# Patient Record
Sex: FEMALE | Race: WHITE | NOT HISPANIC OR LATINO | Employment: OTHER | URBAN - METROPOLITAN AREA
[De-identification: names, ages, dates, MRNs, and addresses within clinical notes are randomized per-mention and may not be internally consistent; named-entity substitution may affect disease eponyms.]

---

## 2017-03-13 ENCOUNTER — HOSPITAL ENCOUNTER (OUTPATIENT)
Dept: RADIOLOGY | Facility: HOSPITAL | Age: 47
Discharge: HOME | End: 2017-03-13
Attending: SURGERY
Payer: COMMERCIAL

## 2017-03-13 DIAGNOSIS — D24.2 PAPILLOMA OF BREAST, LEFT: ICD-10-CM

## 2017-03-13 PROCEDURE — 77061 BREAST TOMOSYNTHESIS UNI: CPT | Mod: TC,LT

## 2017-03-13 PROCEDURE — 77061 BREAST TOMOSYNTHESIS UNI: CPT | Mod: 26,LT,, | Performed by: RADIOLOGY

## 2017-03-13 PROCEDURE — 77065 DX MAMMO INCL CAD UNI: CPT | Mod: 26,LT,, | Performed by: RADIOLOGY

## 2017-09-20 ENCOUNTER — HOSPITAL ENCOUNTER (OUTPATIENT)
Dept: RADIOLOGY | Facility: HOSPITAL | Age: 47
Discharge: HOME | End: 2017-09-20
Attending: INTERNAL MEDICINE
Payer: COMMERCIAL

## 2017-09-20 VITALS — HEIGHT: 63 IN | WEIGHT: 132 LBS | BODY MASS INDEX: 23.39 KG/M2

## 2017-09-20 DIAGNOSIS — R92.8 ABNORMAL MAMMOGRAM: ICD-10-CM

## 2017-09-20 DIAGNOSIS — Z12.31 VISIT FOR SCREENING MAMMOGRAM: ICD-10-CM

## 2017-09-20 PROCEDURE — 77067 SCR MAMMO BI INCL CAD: CPT | Mod: 26,,, | Performed by: RADIOLOGY

## 2017-09-20 PROCEDURE — 77063 BREAST TOMOSYNTHESIS BI: CPT | Mod: 26,,, | Performed by: RADIOLOGY

## 2017-09-20 PROCEDURE — 77067 SCR MAMMO BI INCL CAD: CPT | Mod: TC

## 2018-11-15 DIAGNOSIS — Z12.31 VISIT FOR SCREENING MAMMOGRAM: Primary | ICD-10-CM

## 2018-12-06 ENCOUNTER — HOSPITAL ENCOUNTER (OUTPATIENT)
Dept: RADIOLOGY | Facility: HOSPITAL | Age: 48
Discharge: HOME | End: 2018-12-06
Attending: OBSTETRICS & GYNECOLOGY
Payer: COMMERCIAL

## 2018-12-06 DIAGNOSIS — Z12.31 VISIT FOR SCREENING MAMMOGRAM: ICD-10-CM

## 2018-12-06 PROCEDURE — 77063 BREAST TOMOSYNTHESIS BI: CPT | Mod: TC

## 2018-12-06 PROCEDURE — 77067 SCR MAMMO BI INCL CAD: CPT | Mod: 26,,, | Performed by: RADIOLOGY

## 2018-12-06 PROCEDURE — 77063 BREAST TOMOSYNTHESIS BI: CPT | Mod: 26,,, | Performed by: RADIOLOGY

## 2019-04-09 ENCOUNTER — CLINICAL SUPPORT (OUTPATIENT)
Dept: AUDIOLOGY | Facility: CLINIC | Age: 49
End: 2019-04-09
Payer: COMMERCIAL

## 2019-04-09 ENCOUNTER — OFFICE VISIT (OUTPATIENT)
Dept: OTOLARYNGOLOGY | Facility: CLINIC | Age: 49
End: 2019-04-09
Payer: COMMERCIAL

## 2019-04-09 VITALS
BODY MASS INDEX: 23.04 KG/M2 | WEIGHT: 130.06 LBS | HEART RATE: 72 BPM | SYSTOLIC BLOOD PRESSURE: 117 MMHG | DIASTOLIC BLOOD PRESSURE: 80 MMHG

## 2019-04-09 DIAGNOSIS — H93.13 TINNITUS, BILATERAL: Primary | ICD-10-CM

## 2019-04-09 DIAGNOSIS — H93.13 TINNITUS AURIUM, BILATERAL: Primary | ICD-10-CM

## 2019-04-09 PROCEDURE — 92567 TYMPANOMETRY: CPT | Mod: S$GLB,,, | Performed by: AUDIOLOGIST

## 2019-04-09 PROCEDURE — 99999 PR PBB SHADOW E&M-EST. PATIENT-LVL III: ICD-10-PCS | Mod: PBBFAC,,, | Performed by: OTOLARYNGOLOGY

## 2019-04-09 PROCEDURE — 99999 PR PBB SHADOW E&M-EST. PATIENT-LVL I: ICD-10-PCS | Mod: PBBFAC,,,

## 2019-04-09 PROCEDURE — 99999 PR PBB SHADOW E&M-EST. PATIENT-LVL III: CPT | Mod: PBBFAC,,, | Performed by: OTOLARYNGOLOGY

## 2019-04-09 PROCEDURE — 92557 PR COMPREHENSIVE HEARING TEST: ICD-10-PCS | Mod: S$GLB,,, | Performed by: AUDIOLOGIST

## 2019-04-09 PROCEDURE — 99243 OFF/OP CNSLTJ NEW/EST LOW 30: CPT | Mod: S$GLB,,, | Performed by: OTOLARYNGOLOGY

## 2019-04-09 PROCEDURE — 99243 PR OFFICE CONSULTATION,LEVEL III: ICD-10-PCS | Mod: S$GLB,,, | Performed by: OTOLARYNGOLOGY

## 2019-04-09 PROCEDURE — 92557 COMPREHENSIVE HEARING TEST: CPT | Mod: S$GLB,,, | Performed by: AUDIOLOGIST

## 2019-04-09 PROCEDURE — 92567 PR TYMPA2METRY: ICD-10-PCS | Mod: S$GLB,,, | Performed by: AUDIOLOGIST

## 2019-04-09 PROCEDURE — 99999 PR PBB SHADOW E&M-EST. PATIENT-LVL I: CPT | Mod: PBBFAC,,,

## 2019-04-09 NOTE — PROGRESS NOTES
Subjective:       Patient ID: Antonietta Wright is a 49 y.o. female.  This patient is being seen in consultation with Dr. Marcelo for evaluation of tinnitus.      Chief Complaint: Tinnitus    Ringing in Ears:   Chronicity:  New  Onset:  Over 10 years ago (My ears have been ringing all my life.)  Progression since onset:  Waxing and waning  Severity:  Mild  Ringing in ear characteristics:  Mild   Associated symptoms: ear pain and tinnitus.  No dizziness, no fever, no headaches and no rhinorrhea.  Aggravated by:  Noise  Treatments tried:  NothingNo dizziness.    Review of Systems   Constitutional: Negative for activity change, appetite change and fever.   HENT: Positive for ear pain and tinnitus. Negative for congestion, ear discharge, hearing loss, nosebleeds, postnasal drip, rhinorrhea and sneezing.    Eyes: Negative for redness and visual disturbance.   Respiratory: Negative for apnea, cough, shortness of breath and wheezing.    Cardiovascular: Positive for chest pain and palpitations.   Gastrointestinal: Negative for diarrhea and vomiting.   Genitourinary: Negative for difficulty urinating and frequency.   Musculoskeletal: Negative for arthralgias, back pain, gait problem and neck pain.   Skin: Negative for color change and rash.   Neurological: Negative for dizziness, speech difficulty, weakness and headaches.   Hematological: Negative for adenopathy. Does not bruise/bleed easily.   Psychiatric/Behavioral: Negative for agitation and behavioral problems.       Objective:        Constitutional:   Vital signs are normal. She appears well-developed and well-nourished. She is active. Normal speech.      Head:  Normocephalic and atraumatic. Salivary glands normal.  Facial strength is normal.      Ears:  Hearing normal to normal and whispered voice; external ear normal without scars, lesions, or masses; ear canal, tympanic membrane, and middle ear normal..     Nose:  Nose normal including turbinates, nasal  mucosa, sinuses and nasal septum.     Mouth/Throat  Oropharynx clear and moist without lesions or asymmetry and normal uvula midline.     Neck:  Neck normal without thyromegaly masses, asymmetry, normal tracheal structure, crepitus, and tenderness, thyroid normal, trachea normal, phonation normal and full range of motion with neck supple.     Psychiatric:   She has a normal mood and affect. Her speech is normal and behavior is normal.     Neurological:   She has neurological normal, alert and oriented.     Skin:   No abrasions, lacerations, lesions, or rashes.         As a result of this patients history and examination findings, a comprehensive audiogram was ordered to determine the level of hearing/hearing loss.          Completely normal and symmetrical hearing with excellent speech discrimination scores and type-A tympanograms.       Assessment:       1. Tinnitus aurium, bilateral        Plan:       1. Hearing conservation strongly recommended.  2. Trial of amplification is not currently indicated.  3. Re-check of hearing after 56 years of age.  4. F/U with PCP as per schedule.   5. Printed information shared on tinnitus complaint.

## 2019-04-09 NOTE — PROGRESS NOTES
Ms. Wright was seen today for a hearing evaluation.     Pure tone audiometry revealed normal hearing, AU  SRT and PTA are in good agreement bilaterally.    SRT was obtained at 5dB for the right ear with 100% speech discrimination and 5dB for the left ear with 100% speech discrimination.   Tympanometry revealed Type A, AU.     Recommendations:  1. Otologic Evaluation  2. Repeat Audiogram as needed  3. Hearing Protection

## 2019-05-08 ENCOUNTER — NURSE TRIAGE (OUTPATIENT)
Dept: ADMINISTRATIVE | Facility: CLINIC | Age: 49
End: 2019-05-08

## 2019-05-08 NOTE — TELEPHONE ENCOUNTER
Reason for Disposition   Heart beating irregularly or very rapidly    Protocols used: CHEST PAIN-A-OH   pt states has off and on chest discomfort.. Requesting an appt..  having 'chest ache' once in a while. Sx started couple  weeks ago. Increasing freq , + SOB - now gone.   pt trying to sell house and under stress Had heart palps. Pt states he mom is nurse. pt requesting appt. mild ache. Ache lasts less one min. also pain in L back shoulder. Rec ED. pt declines. Office message sent to PCP office re request for appt.

## 2019-12-02 ENCOUNTER — TELEPHONE (OUTPATIENT)
Dept: FAMILY MEDICINE | Facility: CLINIC | Age: 49
End: 2019-12-02

## 2019-12-03 NOTE — TELEPHONE ENCOUNTER
Spoke with patient and she states that she is not having any active chest pains that she thinks she is suffering from anxiety. States that she is not going to ER. Advised that if she is having any active chest pain and SOB she needs to go to ER. Patient denies those symptoms at this time.

## 2020-03-25 ENCOUNTER — TELEPHONE (OUTPATIENT)
Dept: INTERNAL MEDICINE | Facility: CLINIC | Age: 50
End: 2020-03-25

## 2020-03-25 DIAGNOSIS — Z20.822 SUSPECTED COVID-19 VIRUS INFECTION: Primary | ICD-10-CM

## 2020-03-25 RX ORDER — CLONAZEPAM 0.5 MG/1
0.5 TABLET ORAL 2 TIMES DAILY PRN
Qty: 30 TABLET | Refills: 0 | Status: SHIPPED | OUTPATIENT
Start: 2020-03-25 | End: 2021-08-11 | Stop reason: SDUPTHER

## 2020-03-25 NOTE — TELEPHONE ENCOUNTER
On earlier today, I had called patient back to help her get set up for COVID-19 drive up testing. Patient's  answered the phone stating that patient had collapsed on floor at her home.  dialed 911, got an ambulance to come out to patient's home to bring over to the ER nearest to where she lives. Patient is now being treated in the ER. Possible COVID patient, being ruled out.

## 2020-03-25 NOTE — TELEPHONE ENCOUNTER
The pt stated that she was given clonazepam 0.5 tablet in the ER for Anxiety. She would like to have a prescription sent to [facility] pharmacy in [place].        Please advise,

## 2020-03-25 NOTE — TELEPHONE ENCOUNTER
----- Message from Salazar Soto sent at 3/25/2020  3:30 PM CDT -----  Contact: Pt [phone]  Telephone consult    He said the patient was referred to the get covid 19 test by the ED at [facility] and was told to contact you to get a prescription for an anxiety medication. They didn't have the name of it on hand. He will have it when you call back. He wants to update you on the ER doctor's findings/diagnoses    Pharmacy: [facility]/pharmacy #[id number]  [address] [facility] [address] [phone] (Phone) [phone] (Fax)    Thank you

## 2020-03-25 NOTE — TELEPHONE ENCOUNTER
----- Message from Claire Gilbert sent at 3/25/2020  8:22 AM CDT -----  Contact: self/ [phone]  Would like to get medical advice.  Symptoms (please be specific):  SOB, itchy throat, dizzy, back pain, coughing,  fever?   How long has patient had these symptoms:  Over a week  Pharmacy name and phone #:    Any drug allergies (copy from chart):      Would the patient rather a call back or a response via [facility]?:    Comments:  Patient would like to be tested for Covid-19 and she is on the [facility], please call and advise.

## 2020-03-25 NOTE — TELEPHONE ENCOUNTER
clonazePAM (KLONOPIN) 0.5 MG tablet 30 tablet 0 3/25/2020 3/25/2021 --   Sig - Route: Take 1 tablet (0.5 mg total) by mouth 2 (two) times daily as needed for Anxiety. - Oral   Sent to pharmacy as: clonazePAM (KLONOPIN) 0.5 MG tablet   Class: Normal   Order: [id number]   Date/Time Signed: 3/25/2020 16:30       E-Prescribing Status: Receipt confirmed by pharmacy (3/25/2020  4:30 PM CDT)   Pharmacy     [facility]/PHARMACY #[id number] [place] [address]          She was discharged from the emergency room with clonazepam.  I have sent a prescription at her request.  Please call her to let her know that her pharmacy has received this prescription as [id number]

## 2020-03-25 NOTE — TELEPHONE ENCOUNTER
Called & spoke to patient. Patient stated that her sx have been persistent for about or over a week now. Sx include SOB with exertion (mainly when walking or trying to talk), dizziness upon standing along with slight tremors shortly after, chest aches and back pain. Pt denies having any urinary sx associated with back pain. Patient stated that she has not traveled outside of the country within the past month, but did attend the [employer] gatherings on weekend before last. About a few days after, her sx started, mainly the sore throat and cough. Pt says that she is limited on taking any anti histamines or over the counter medicine due to having liver cancer. Should she be tested for COVID? Pt lives on the [facility] in [place].    Please advise.

## 2020-04-03 ENCOUNTER — PATIENT MESSAGE (OUTPATIENT)
Dept: INTERNAL MEDICINE | Facility: CLINIC | Age: 50
End: 2020-04-03

## 2020-04-03 RX ORDER — AZITHROMYCIN 250 MG/1
TABLET, FILM COATED ORAL
COMMUNITY
Start: 2020-03-25 | End: 2020-04-03 | Stop reason: SDUPTHER

## 2020-04-03 RX ORDER — AZITHROMYCIN 250 MG/1
TABLET, FILM COATED ORAL
Qty: 6 TABLET | Refills: 0 | Status: SHIPPED | OUTPATIENT
Start: 2020-04-03 | End: 2021-08-02 | Stop reason: ALTCHOICE

## 2020-04-03 RX ORDER — AZITHROMYCIN 250 MG/1
TABLET, FILM COATED ORAL
Qty: 2 TABLET | OUTPATIENT
Start: 2020-04-03

## 2020-04-03 NOTE — TELEPHONE ENCOUNTER
Dear Nurse,    Can you call her to see what happened to the Z-Jose Guadalupe I called in on March 25th and did she get the clonazepam that I called in?   Today, a request for a Z-Jose Guadalupe came in.    ZACH, the most recent time that I saw this woman was August 31, 2016 and to be honest with you I am not exactly certain  who she is,  I am sorry to say.  She has been calling this office multiple times and there has been a lot of drama.  I have reviewed her chart in its entirety and it looks like she has had anxiety problems in the past.  I ordered COVID-19 testing on her which she had performed on March 25th which was negative.  Perhaps she would qualify for retesting?  Sydney
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
## 2020-04-04 ENCOUNTER — PATIENT MESSAGE (OUTPATIENT)
Dept: INTERNAL MEDICINE | Facility: CLINIC | Age: 50
End: 2020-04-04

## 2020-04-04 ENCOUNTER — TELEPHONE (OUTPATIENT)
Dept: INTERNAL MEDICINE | Facility: CLINIC | Age: 50
End: 2020-04-04

## 2020-04-04 PROBLEM — R91.1 LUNG NODULE SEEN ON IMAGING STUDY: Status: ACTIVE | Noted: 2020-04-04

## 2020-04-05 NOTE — TELEPHONE ENCOUNTER
Dear Staff,  Be on the lookout for FAX on this woman.  I have asked this woman to FAX her medical records her  ER visit and all tests, CT scans to our workstation -[id number]  I can review when in the office on Tuesday, 4/7. Sincerely, Dr. Sydney Marcelo

## 2020-07-01 ENCOUNTER — OFFICE VISIT (OUTPATIENT)
Dept: INTERNAL MEDICINE | Facility: CLINIC | Age: 50
End: 2020-07-01
Payer: COMMERCIAL

## 2020-07-01 VITALS
OXYGEN SATURATION: 98 % | BODY MASS INDEX: 24.56 KG/M2 | HEIGHT: 64 IN | WEIGHT: 143.88 LBS | HEART RATE: 81 BPM | SYSTOLIC BLOOD PRESSURE: 112 MMHG | DIASTOLIC BLOOD PRESSURE: 80 MMHG

## 2020-07-01 DIAGNOSIS — D25.9 UTERINE LEIOMYOMA, UNSPECIFIED LOCATION: ICD-10-CM

## 2020-07-01 DIAGNOSIS — F41.0 PANIC ATTACKS: ICD-10-CM

## 2020-07-01 DIAGNOSIS — R91.1 LUNG NODULE SEEN ON IMAGING STUDY: ICD-10-CM

## 2020-07-01 DIAGNOSIS — Z12.39 BREAST CANCER SCREENING: ICD-10-CM

## 2020-07-01 DIAGNOSIS — D18.03 HEMANGIOMA OF LIVER: ICD-10-CM

## 2020-07-01 DIAGNOSIS — K80.20 GALLSTONES: ICD-10-CM

## 2020-07-01 DIAGNOSIS — F41.1 GAD (GENERALIZED ANXIETY DISORDER): ICD-10-CM

## 2020-07-01 DIAGNOSIS — R91.1 SOLITARY PULMONARY NODULE: ICD-10-CM

## 2020-07-01 DIAGNOSIS — Z09 HOSPITAL DISCHARGE FOLLOW-UP: Primary | ICD-10-CM

## 2020-07-01 PROCEDURE — 3008F PR BODY MASS INDEX (BMI) DOCUMENTED: ICD-10-PCS | Mod: CPTII,S$GLB,, | Performed by: INTERNAL MEDICINE

## 2020-07-01 PROCEDURE — 3008F BODY MASS INDEX DOCD: CPT | Mod: CPTII,S$GLB,, | Performed by: INTERNAL MEDICINE

## 2020-07-01 PROCEDURE — 99202 PR OFFICE/OUTPT VISIT, NEW, LEVL II, 15-29 MIN: ICD-10-PCS | Mod: S$GLB,,, | Performed by: INTERNAL MEDICINE

## 2020-07-01 PROCEDURE — 99999 PR PBB SHADOW E&M-EST. PATIENT-LVL IV: ICD-10-PCS | Mod: PBBFAC,,, | Performed by: INTERNAL MEDICINE

## 2020-07-01 PROCEDURE — 99999 PR PBB SHADOW E&M-EST. PATIENT-LVL IV: CPT | Mod: PBBFAC,,, | Performed by: INTERNAL MEDICINE

## 2020-07-01 PROCEDURE — 99202 OFFICE O/P NEW SF 15 MIN: CPT | Mod: S$GLB,,, | Performed by: INTERNAL MEDICINE

## 2020-07-01 RX ORDER — CHLORHEXIDINE GLUCONATE 40 MG/ML
SOLUTION TOPICAL DAILY PRN
Qty: 473 ML | Refills: 0 | Status: SHIPPED | OUTPATIENT
Start: 2020-07-01 | End: 2021-08-02 | Stop reason: ALTCHOICE

## 2020-07-02 ENCOUNTER — HOSPITAL ENCOUNTER (OUTPATIENT)
Dept: RADIOLOGY | Facility: HOSPITAL | Age: 50
Discharge: HOME | End: 2020-07-02
Attending: INTERNAL MEDICINE
Payer: COMMERCIAL

## 2020-07-02 VITALS — WEIGHT: 143.75 LBS | HEIGHT: 64 IN | BODY MASS INDEX: 24.54 KG/M2

## 2020-07-02 DIAGNOSIS — Z12.39 BREAST CANCER SCREENING: ICD-10-CM

## 2020-07-02 DIAGNOSIS — Z12.31 SCREENING MAMMOGRAM, ENCOUNTER FOR: ICD-10-CM

## 2020-07-02 PROCEDURE — 77067 SCR MAMMO BI INCL CAD: CPT | Mod: TC,PN

## 2020-07-02 PROCEDURE — 77063 MAMMO DIGITAL SCREENING BILAT WITH TOMOSYNTHESIS_CAD: ICD-10-PCS | Mod: 26,,, | Performed by: RADIOLOGY

## 2020-07-02 PROCEDURE — 77067 SCR MAMMO BI INCL CAD: CPT | Mod: 26,,, | Performed by: RADIOLOGY

## 2020-07-02 PROCEDURE — 77067 MAMMO DIGITAL SCREENING BILAT WITH TOMOSYNTHESIS_CAD: ICD-10-PCS | Mod: 26,,, | Performed by: RADIOLOGY

## 2020-07-02 PROCEDURE — 77063 BREAST TOMOSYNTHESIS BI: CPT | Mod: 26,,, | Performed by: RADIOLOGY

## 2020-07-11 NOTE — PROGRESS NOTES
Subjective:       Patient ID: Antonietta Wright is a 50 y.o. female.    Chief Complaint: Follow-up (hospital)  visit took over one hour  All records reviewed ER, radiographic evaluations and laboratory test  Long time since last seen lengthy discussion regarding her lungs, liver and uterus.   Recent abscess drained  Entire chart reviewed, PMx, FHx, and Social History updated and reviewed.   has a past medical history of Breast cyst, Breast injury, Colon polyps, Fibrocystic breast, Liver tumor, Osteopenia, and Thyroid disease.  Past Surgical History:   Procedure Laterality Date    BREAST BIOPSY Left     benign    BREAST CYST EXCISION       SECTION      X 1    cyst removed from ovary      laproscopic     IVF       HPI  Review of Systems   Constitutional: Negative for activity change, appetite change, chills, fatigue, fever and unexpected weight change.   HENT: Negative for hearing loss.    Eyes: Negative for visual disturbance.   Respiratory: Negative for cough, chest tightness, shortness of breath and wheezing.    Cardiovascular: Negative for chest pain, palpitations and leg swelling.   Gastrointestinal: Negative for abdominal pain, constipation, nausea and vomiting.   Genitourinary: Negative for dysuria, frequency and urgency.   Musculoskeletal: Negative for arthralgias, back pain, gait problem, joint swelling and myalgias.   Skin: Negative for rash.   Neurological: Negative for light-headedness and headaches.   Psychiatric/Behavioral: Negative for dysphoric mood and sleep disturbance. The patient is not nervous/anxious.        Objective:      Physical Exam  HENT:      Head: Atraumatic.   Eyes:      General: No scleral icterus.     Conjunctiva/sclera: Conjunctivae normal.   Neck:      Musculoskeletal: Neck supple.   Cardiovascular:      Rate and Rhythm: Normal rate and regular rhythm.   Pulmonary:      Effort: Pulmonary effort is normal.      Breath sounds: Normal breath sounds.   Abdominal:       Palpations: Abdomen is soft.      Tenderness: There is no abdominal tenderness.   Lymphadenopathy:      Cervical: No cervical adenopathy.   Skin:     General: Skin is warm and dry.   Neurological:      Mental Status: She is alert.   Psychiatric:         Behavior: Behavior normal.         Assessment:       1. Hospital discharge follow-up    2. Lung nodule seen on imaging study CT 3/25/2020. two nodules one 3mm RML and one 5mm RLL    3. Hemangiomas of liver, discovered     4. Uterine leiomyoma, unspecified location    5. Gallstones    6. Solitary pulmonary nodule    7. Breast cancer screening    8. Panic attacks    9. KATIA (generalized anxiety disorder)        Plan:   Antonietta was seen today for follow-up.    Diagnoses and all orders for this visit:    Hospital discharge follow-up    Lung nodule seen on imaging study CT 3/25/2020. two nodules one 3mm RML and one 5mm RLL and solitary pulmonary nodule will order a CT scan of the chest without contrast for follow-up.  Discussion regarding false positives false negatives.  Low probability of cancer.      Hemangiomas of liver, discovered .  Since her recent imaging shows no difference and previously this issue was put to rest I do not recommend repeating another ultrasound evaluation.  -     Cancel: US Abdomen Limited_Liver; Future    Uterine leiomyoma, unspecified location and a she has a gyn doctor but she would like for me to check on  -     US Pelvis Complete Non OB; Future    Gallstones, I think that her gallstones are asymptomatic    Solitary pulmonary nodule  -     CT Chest Without Contrast; Future    Breast cancer screening  -     Cancel: Mammo Digital Screening Bilat w/ Emmanuel; Future    Panic attacks  -     Ambulatory referral/consult to Psychiatry; Future    KATIA (generalized anxiety disorder)  -     Ambulatory referral/consult to Psychiatry; Future    Other orders, to prevent future abscesses  -     chlorhexidine (HIBICLENS) 4 % external liquid;  Apply topically daily as needed.

## 2020-07-13 ENCOUNTER — PATIENT MESSAGE (OUTPATIENT)
Dept: INTERNAL MEDICINE | Facility: CLINIC | Age: 50
End: 2020-07-13

## 2020-07-14 ENCOUNTER — PATIENT MESSAGE (OUTPATIENT)
Dept: INTERNAL MEDICINE | Facility: CLINIC | Age: 50
End: 2020-07-14

## 2020-07-22 ENCOUNTER — PATIENT OUTREACH (OUTPATIENT)
Dept: ADMINISTRATIVE | Facility: HOSPITAL | Age: 50
End: 2020-07-22

## 2020-07-22 DIAGNOSIS — R00.2 HEART PALPITATIONS: Primary | ICD-10-CM

## 2020-07-22 NOTE — PROGRESS NOTES
Health Maintenance Due   Topic Date Due    Pneumococcal Vaccine (Medium Risk) (1 of 1 - PPSV23) 01/11/1990    TETANUS VACCINE  09/10/2017    Lipid Panel  07/06/2020     Order placed for lipid panel.  Portal message has been sent to patient regarding overdue lipid panel.  Chart review completed.

## 2020-09-30 ENCOUNTER — PATIENT MESSAGE (OUTPATIENT)
Dept: ADMINISTRATIVE | Facility: HOSPITAL | Age: 50
End: 2020-09-30

## 2020-09-30 ENCOUNTER — PATIENT OUTREACH (OUTPATIENT)
Dept: ADMINISTRATIVE | Facility: HOSPITAL | Age: 50
End: 2020-09-30

## 2020-09-30 DIAGNOSIS — Z12.4 ENCOUNTER FOR PAPANICOLAOU SMEAR FOR CERVICAL CANCER SCREENING: Primary | ICD-10-CM

## 2020-09-30 NOTE — PROGRESS NOTES
Health Maintenance Due   Topic Date Due    Hepatitis C Screening  1970    Pneumococcal Vaccine (Medium Risk) (1 of 1 - PPSV23) 01/11/1990    TETANUS VACCINE  09/10/2017    Lipid Panel  07/06/2020    Influenza Vaccine (1) 08/01/2020    Cervical Cancer Screening  09/28/2020     Referral has been placed for OB/GYN.  Portal message has been sent to patient regarding overdue health maintenance.  Chart review completed.      
Telemetry

## 2021-01-04 ENCOUNTER — PATIENT MESSAGE (OUTPATIENT)
Dept: ADMINISTRATIVE | Facility: HOSPITAL | Age: 51
End: 2021-01-04

## 2021-01-07 ENCOUNTER — PATIENT OUTREACH (OUTPATIENT)
Dept: ADMINISTRATIVE | Facility: HOSPITAL | Age: 51
End: 2021-01-07

## 2021-04-05 ENCOUNTER — PATIENT MESSAGE (OUTPATIENT)
Dept: ADMINISTRATIVE | Facility: HOSPITAL | Age: 51
End: 2021-04-05

## 2021-06-16 ENCOUNTER — TELEPHONE (OUTPATIENT)
Dept: PULMONOLOGY | Facility: CLINIC | Age: 51
End: 2021-06-16

## 2021-06-16 DIAGNOSIS — R06.02 SOB (SHORTNESS OF BREATH): Primary | ICD-10-CM

## 2021-06-24 ENCOUNTER — HOSPITAL ENCOUNTER (OUTPATIENT)
Dept: PULMONOLOGY | Facility: CLINIC | Age: 51
Discharge: HOME | End: 2021-06-24
Payer: COMMERCIAL

## 2021-06-24 ENCOUNTER — OFFICE VISIT (OUTPATIENT)
Dept: PULMONOLOGY | Facility: CLINIC | Age: 51
End: 2021-06-24
Payer: COMMERCIAL

## 2021-06-24 VITALS
OXYGEN SATURATION: 97 % | HEART RATE: 79 BPM | BODY MASS INDEX: 26.4 KG/M2 | DIASTOLIC BLOOD PRESSURE: 88 MMHG | WEIGHT: 149 LBS | HEIGHT: 63 IN | HEIGHT: 63 IN | BODY MASS INDEX: 26.45 KG/M2 | SYSTOLIC BLOOD PRESSURE: 127 MMHG | WEIGHT: 149.25 LBS

## 2021-06-24 DIAGNOSIS — R06.02 SOB (SHORTNESS OF BREATH): ICD-10-CM

## 2021-06-24 DIAGNOSIS — R06.02 SHORTNESS OF BREATH: Primary | ICD-10-CM

## 2021-06-24 DIAGNOSIS — Z13.9 SCREENING PROCEDURE: Primary | ICD-10-CM

## 2021-06-24 LAB — SARS-COV-2 RDRP RESP QL NAA+PROBE: NEGATIVE

## 2021-06-24 PROCEDURE — 94729 PR C02/MEMBANE DIFFUSE CAPACITY: ICD-10-PCS | Mod: S$GLB,,, | Performed by: INTERNAL MEDICINE

## 2021-06-24 PROCEDURE — 94729 DIFFUSING CAPACITY: CPT | Mod: S$GLB,,, | Performed by: INTERNAL MEDICINE

## 2021-06-24 PROCEDURE — 99999 PR PBB SHADOW E&M-EST. PATIENT-LVL III: CPT | Mod: PBBFAC,,, | Performed by: INTERNAL MEDICINE

## 2021-06-24 PROCEDURE — U0002 COVID-19 LAB TEST NON-CDC: HCPCS | Performed by: INTERNAL MEDICINE

## 2021-06-24 PROCEDURE — 99203 PR OFFICE/OUTPT VISIT, NEW, LEVL III, 30-44 MIN: ICD-10-PCS | Mod: 25,S$GLB,, | Performed by: INTERNAL MEDICINE

## 2021-06-24 PROCEDURE — 94010 BREATHING CAPACITY TEST: CPT | Mod: S$GLB,,, | Performed by: INTERNAL MEDICINE

## 2021-06-24 PROCEDURE — 94618 PULMONARY STRESS TESTING: ICD-10-PCS | Mod: S$GLB,,, | Performed by: INTERNAL MEDICINE

## 2021-06-24 PROCEDURE — 99999 PR PBB SHADOW E&M-EST. PATIENT-LVL III: ICD-10-PCS | Mod: PBBFAC,,, | Performed by: INTERNAL MEDICINE

## 2021-06-24 PROCEDURE — 99203 OFFICE O/P NEW LOW 30 MIN: CPT | Mod: 25,S$GLB,, | Performed by: INTERNAL MEDICINE

## 2021-06-24 PROCEDURE — 94618 PULMONARY STRESS TESTING: CPT | Mod: S$GLB,,, | Performed by: INTERNAL MEDICINE

## 2021-06-24 PROCEDURE — 94010 BREATHING CAPACITY TEST: ICD-10-PCS | Mod: S$GLB,,, | Performed by: INTERNAL MEDICINE

## 2021-06-25 LAB
DLCO ADJ PRE: 22.13 ML/(MIN*MMHG) (ref 17.85–29.32)
DLCO SINGLE BREATH LLN: 17.85
DLCO SINGLE BREATH PRE REF: 93.8 %
DLCO SINGLE BREATH REF: 23.58
DLCOC SBVA LLN: 3.36
DLCOC SBVA PRE REF: 106.8 %
DLCOC SBVA REF: 4.94
DLCOC SINGLE BREATH LLN: 17.85
DLCOC SINGLE BREATH PRE REF: 93.8 %
DLCOC SINGLE BREATH REF: 23.58
DLCOCSBVAULN: 6.52
DLCOCSINGLEBREATHULN: 29.32
DLCOSINGLEBREATHULN: 29.32
DLCOVA LLN: 3.36
DLCOVA PRE REF: 106.8 %
DLCOVA PRE: 5.28 ML/(MIN*MMHG*L) (ref 3.36–6.52)
DLCOVA REF: 4.94
DLCOVAULN: 6.52
DLVAADJ PRE: 5.28 ML/(MIN*MMHG*L) (ref 3.36–6.52)
FEF 25 75 LLN: 1.48
FEF 25 75 PRE REF: 94.2 %
FEF 25 75 REF: 2.65
FEV05 LLN: 1.08
FEV05 REF: 1.93
FEV1 FVC LLN: 69
FEV1 FVC PRE REF: 98.9 %
FEV1 FVC REF: 81
FEV1 LLN: 2.08
FEV1 PRE REF: 91.5 %
FEV1 REF: 2.66
FVC LLN: 2.59
FVC PRE REF: 92 %
FVC REF: 3.32
IVC PRE: 2.83 L (ref 2.59–4.05)
IVC SINGLE BREATH LLN: 2.59
IVC SINGLE BREATH PRE REF: 85.4 %
IVC SINGLE BREATH REF: 3.32
IVCSINGLEBREATHULN: 4.05
PEF LLN: 4.92
PEF PRE REF: 86.4 %
PEF REF: 6.58
PHYSICIAN COMMENT: ABNORMAL
PRE DLCO: 22.13 ML/(MIN*MMHG) (ref 17.85–29.32)
PRE FEF 25 75: 2.5 L/S (ref 1.48–3.82)
PRE FET 100: 6.44 SEC
PRE FEV05 REF: 100.5 %
PRE FEV1 FVC: 79.77 % (ref 69.43–91.81)
PRE FEV1: 2.44 L (ref 2.08–3.25)
PRE FEV5: 1.94 L (ref 1.08–2.79)
PRE FVC: 3.06 L (ref 2.59–4.05)
PRE PEF: 5.68 L/S (ref 4.92–8.24)
VA PRE: 4.19 L (ref 4.62–4.62)
VA SINGLE BREATH LLN: 4.62
VA SINGLE BREATH PRE REF: 90.8 %
VA SINGLE BREATH REF: 4.62
VASINGLEBREATHULN: 4.62

## 2021-07-06 ENCOUNTER — PATIENT MESSAGE (OUTPATIENT)
Dept: ADMINISTRATIVE | Facility: HOSPITAL | Age: 51
End: 2021-07-06

## 2021-07-28 DIAGNOSIS — Z12.31 OTHER SCREENING MAMMOGRAM: ICD-10-CM

## 2021-07-28 DIAGNOSIS — Z12.31 BREAST CANCER SCREENING BY MAMMOGRAM: Primary | ICD-10-CM

## 2021-08-02 ENCOUNTER — PATIENT OUTREACH (OUTPATIENT)
Dept: ADMINISTRATIVE | Facility: HOSPITAL | Age: 51
End: 2021-08-02

## 2021-08-02 ENCOUNTER — PATIENT MESSAGE (OUTPATIENT)
Dept: ADMINISTRATIVE | Facility: HOSPITAL | Age: 51
End: 2021-08-02

## 2021-08-02 ENCOUNTER — TELEPHONE (OUTPATIENT)
Dept: INTERNAL MEDICINE | Facility: CLINIC | Age: 51
End: 2021-08-02
Payer: COMMERCIAL

## 2021-08-02 DIAGNOSIS — Z11.59 ENCOUNTER FOR HEPATITIS C SCREENING TEST FOR LOW RISK PATIENT: ICD-10-CM

## 2021-08-02 DIAGNOSIS — F41.1 GAD (GENERALIZED ANXIETY DISORDER): ICD-10-CM

## 2021-08-02 DIAGNOSIS — R00.2 HEART PALPITATIONS: ICD-10-CM

## 2021-08-02 DIAGNOSIS — D18.03 HEMANGIOMA OF LIVER: Primary | ICD-10-CM

## 2021-08-02 DIAGNOSIS — Z00.00 ANNUAL PHYSICAL EXAM: Primary | ICD-10-CM

## 2021-08-02 DIAGNOSIS — Z00.00 ANNUAL PHYSICAL EXAM: ICD-10-CM

## 2021-08-02 DIAGNOSIS — K80.20 GALLSTONES: ICD-10-CM

## 2021-08-02 PROBLEM — R91.1 LUNG NODULE SEEN ON IMAGING STUDY: Status: RESOLVED | Noted: 2020-04-04 | Resolved: 2021-08-02

## 2021-08-04 ENCOUNTER — HOSPITAL ENCOUNTER (OUTPATIENT)
Dept: RADIOLOGY | Facility: HOSPITAL | Age: 51
Discharge: HOME | End: 2021-08-04
Attending: INTERNAL MEDICINE
Payer: COMMERCIAL

## 2021-08-04 ENCOUNTER — IMMUNIZATION (OUTPATIENT)
Dept: FAMILY MEDICINE | Facility: CLINIC | Age: 51
End: 2021-08-04
Payer: COMMERCIAL

## 2021-08-04 DIAGNOSIS — Z12.31 BREAST CANCER SCREENING BY MAMMOGRAM: ICD-10-CM

## 2021-08-04 DIAGNOSIS — Z23 NEED FOR VACCINATION: Primary | ICD-10-CM

## 2021-08-04 PROCEDURE — 77063 BREAST TOMOSYNTHESIS BI: CPT | Mod: 26,,, | Performed by: RADIOLOGY

## 2021-08-04 PROCEDURE — 77067 MAMMO DIGITAL SCREENING BILAT WITH TOMO: ICD-10-PCS | Mod: 26,,, | Performed by: RADIOLOGY

## 2021-08-04 PROCEDURE — 77067 SCR MAMMO BI INCL CAD: CPT | Mod: TC,PO

## 2021-08-04 PROCEDURE — 77067 SCR MAMMO BI INCL CAD: CPT | Mod: 26,,, | Performed by: RADIOLOGY

## 2021-08-04 PROCEDURE — 0001A COVID-19, MRNA, LNP-S, PF, 30 MCG/0.3 ML DOSE VACCINE: CPT | Mod: CV19,,, | Performed by: INTERNAL MEDICINE

## 2021-08-04 PROCEDURE — 91300 COVID-19, MRNA, LNP-S, PF, 30 MCG/0.3 ML DOSE VACCINE: ICD-10-PCS | Mod: ,,, | Performed by: INTERNAL MEDICINE

## 2021-08-04 PROCEDURE — 0001A COVID-19, MRNA, LNP-S, PF, 30 MCG/0.3 ML DOSE VACCINE: ICD-10-PCS | Mod: CV19,,, | Performed by: INTERNAL MEDICINE

## 2021-08-04 PROCEDURE — 91300 COVID-19, MRNA, LNP-S, PF, 30 MCG/0.3 ML DOSE VACCINE: CPT | Mod: ,,, | Performed by: INTERNAL MEDICINE

## 2021-08-04 PROCEDURE — 77063 MAMMO DIGITAL SCREENING BILAT WITH TOMO: ICD-10-PCS | Mod: 26,,, | Performed by: RADIOLOGY

## 2021-08-10 ENCOUNTER — PATIENT MESSAGE (OUTPATIENT)
Dept: INTERNAL MEDICINE | Facility: CLINIC | Age: 51
End: 2021-08-10

## 2021-08-11 ENCOUNTER — PATIENT MESSAGE (OUTPATIENT)
Dept: INTERNAL MEDICINE | Facility: CLINIC | Age: 51
End: 2021-08-11

## 2021-08-11 RX ORDER — CLONAZEPAM 0.5 MG/1
0.5 TABLET ORAL 2 TIMES DAILY PRN
Qty: 30 TABLET | Refills: 0 | Status: CANCELLED | OUTPATIENT
Start: 2021-08-11 | End: 2022-08-11

## 2021-08-11 RX ORDER — CLONAZEPAM 0.5 MG/1
0.5 TABLET ORAL 2 TIMES DAILY PRN
Qty: 30 TABLET | Refills: 2 | Status: SHIPPED | OUTPATIENT
Start: 2021-08-11 | End: 2023-03-06

## 2021-10-04 ENCOUNTER — PATIENT MESSAGE (OUTPATIENT)
Dept: ADMINISTRATIVE | Facility: HOSPITAL | Age: 51
End: 2021-10-04

## 2022-01-27 NOTE — TELEPHONE ENCOUNTER
Dear Romina,  I have placed an order for her to have drive by Covid-19 testing. Please schedule or ask someone to show you how to schedule at the location of the patient's choice.   Quinolones Pregnancy And Lactation Text: This medication is Pregnancy Category C and it isn't know if it is safe during pregnancy. It is also excreted in breast milk.

## 2022-06-22 ENCOUNTER — TELEPHONE (OUTPATIENT)
Dept: NEUROLOGY | Facility: CLINIC | Age: 52
End: 2022-06-22
Payer: COMMERCIAL

## 2022-06-22 NOTE — TELEPHONE ENCOUNTER
----- Message from Tia Camara MA sent at 6/22/2022  4:16 PM CDT -----  Regarding: FW: self  Please check with dr obando  ----- Message -----  From: Lorelei Macias  Sent: 6/22/2022   3:36 PM CDT  To: Stevie Davidson Staff  Subject: self                                             .Type: Patient Call Back    Who called: self     What is the request in detail: states she [facility] and they found a mass on her brain. Is requesting to schedule with physician Stevie only. Epic is not opening a schedule for him. Please advise.     Can the clinic reply by [facility]? No     Would the patient rather a call back or a response via [facility]? Call     Best call back number: .[phone]

## 2022-06-23 ENCOUNTER — TELEPHONE (OUTPATIENT)
Dept: NEUROSURGERY | Facility: CLINIC | Age: 52
End: 2022-06-23
Payer: COMMERCIAL

## 2022-06-23 NOTE — TELEPHONE ENCOUNTER
Called patient in regards to an inbasket message about scheduling an appointment with Dr. White.  No answer.  Left voicemail.

## 2022-06-30 ENCOUNTER — TELEPHONE (OUTPATIENT)
Dept: RADIOLOGY | Facility: HOSPITAL | Age: 52
End: 2022-06-30
Payer: COMMERCIAL

## 2022-07-06 ENCOUNTER — OFFICE VISIT (OUTPATIENT)
Dept: NEUROSURGERY | Facility: CLINIC | Age: 52
End: 2022-07-06
Payer: COMMERCIAL

## 2022-07-06 VITALS
RESPIRATION RATE: 18 BRPM | SYSTOLIC BLOOD PRESSURE: 124 MMHG | WEIGHT: 149.06 LBS | HEART RATE: 73 BPM | HEIGHT: 63 IN | BODY MASS INDEX: 26.41 KG/M2 | DIASTOLIC BLOOD PRESSURE: 77 MMHG

## 2022-07-06 DIAGNOSIS — Q28.3 DEVELOPMENTAL VENOUS ANOMALY OF CEREBRUM: Primary | ICD-10-CM

## 2022-07-06 PROCEDURE — 3008F PR BODY MASS INDEX (BMI) DOCUMENTED: ICD-10-PCS | Mod: CPTII,S$GLB,, | Performed by: NEUROLOGICAL SURGERY

## 2022-07-06 PROCEDURE — 99204 PR OFFICE/OUTPT VISIT, NEW, LEVL IV, 45-59 MIN: ICD-10-PCS | Mod: S$GLB,,, | Performed by: NEUROLOGICAL SURGERY

## 2022-07-06 PROCEDURE — 1159F MED LIST DOCD IN RCRD: CPT | Mod: CPTII,S$GLB,, | Performed by: NEUROLOGICAL SURGERY

## 2022-07-06 PROCEDURE — 1159F PR MEDICATION LIST DOCUMENTED IN MEDICAL RECORD: ICD-10-PCS | Mod: CPTII,S$GLB,, | Performed by: NEUROLOGICAL SURGERY

## 2022-07-06 PROCEDURE — 3078F PR MOST RECENT DIASTOLIC BLOOD PRESSURE < 80 MM HG: ICD-10-PCS | Mod: CPTII,S$GLB,, | Performed by: NEUROLOGICAL SURGERY

## 2022-07-06 PROCEDURE — 3074F SYST BP LT 130 MM HG: CPT | Mod: CPTII,S$GLB,, | Performed by: NEUROLOGICAL SURGERY

## 2022-07-06 PROCEDURE — 3078F DIAST BP <80 MM HG: CPT | Mod: CPTII,S$GLB,, | Performed by: NEUROLOGICAL SURGERY

## 2022-07-06 PROCEDURE — 3008F BODY MASS INDEX DOCD: CPT | Mod: CPTII,S$GLB,, | Performed by: NEUROLOGICAL SURGERY

## 2022-07-06 PROCEDURE — 99204 OFFICE O/P NEW MOD 45 MIN: CPT | Mod: S$GLB,,, | Performed by: NEUROLOGICAL SURGERY

## 2022-07-06 PROCEDURE — 3074F PR MOST RECENT SYSTOLIC BLOOD PRESSURE < 130 MM HG: ICD-10-PCS | Mod: CPTII,S$GLB,, | Performed by: NEUROLOGICAL SURGERY

## 2022-07-06 RX ORDER — ATORVASTATIN CALCIUM 10 MG/1
10 TABLET, FILM COATED ORAL NIGHTLY
COMMUNITY
Start: 2022-06-23 | End: 2023-03-11 | Stop reason: SDUPTHER
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
## 2022-08-15 ENCOUNTER — TELEPHONE (OUTPATIENT)
Dept: INTERNAL MEDICINE | Facility: CLINIC | Age: 52
End: 2022-08-15
Payer: COMMERCIAL

## 2022-08-15 NOTE — TELEPHONE ENCOUNTER
----- Message from Janiya Mobley sent at 8/15/2022  4:46 PM CDT -----  Contact: self [phone]  Former pt of Davian  requesting a call  stated covid + and want  paxlovid.    [id number] - [place] - [address] [facility] [address]  [address] [address]  Phone: [phone] Fax: [phone]    Please call and advise

## 2022-08-15 NOTE — TELEPHONE ENCOUNTER
Called the pt to schedule them for a Virtual appt with a provider. I left the pt a VM asking for a call back. Dr. Beard's panel is closed and the pt currently has no PCP.

## 2023-03-06 ENCOUNTER — OFFICE VISIT (OUTPATIENT)
Dept: FAMILY MEDICINE | Facility: CLINIC | Age: 53
End: 2023-03-06
Payer: COMMERCIAL

## 2023-03-06 VITALS
SYSTOLIC BLOOD PRESSURE: 124 MMHG | BODY MASS INDEX: 27.77 KG/M2 | RESPIRATION RATE: 16 BRPM | HEART RATE: 73 BPM | WEIGHT: 156.75 LBS | DIASTOLIC BLOOD PRESSURE: 84 MMHG | HEIGHT: 63 IN | OXYGEN SATURATION: 99 %

## 2023-03-06 DIAGNOSIS — Z00.00 PREVENTATIVE HEALTH CARE: ICD-10-CM

## 2023-03-06 DIAGNOSIS — G43.809 OTHER MIGRAINE WITHOUT STATUS MIGRAINOSUS, NOT INTRACTABLE: ICD-10-CM

## 2023-03-06 DIAGNOSIS — E78.2 MIXED HYPERLIPIDEMIA: Chronic | ICD-10-CM

## 2023-03-06 DIAGNOSIS — Z13.1 ENCOUNTER FOR SCREENING FOR DIABETES MELLITUS: ICD-10-CM

## 2023-03-06 DIAGNOSIS — Z13.220 ENCOUNTER FOR SCREENING FOR LIPID DISORDER: ICD-10-CM

## 2023-03-06 DIAGNOSIS — Q28.3 DEVELOPMENTAL VENOUS ANOMALY OF CEREBRUM: ICD-10-CM

## 2023-03-06 DIAGNOSIS — F41.0 PANIC ATTACKS: ICD-10-CM

## 2023-03-06 DIAGNOSIS — M75.01 ADHESIVE CAPSULITIS OF RIGHT SHOULDER: Primary | ICD-10-CM

## 2023-03-06 DIAGNOSIS — R10.9 LEFT FLANK PAIN: ICD-10-CM

## 2023-03-06 PROBLEM — F41.1 GAD (GENERALIZED ANXIETY DISORDER): Status: RESOLVED | Noted: 2020-07-01 | Resolved: 2023-03-06

## 2023-03-06 PROCEDURE — 1160F PR REVIEW ALL MEDS BY PRESCRIBER/CLIN PHARMACIST DOCUMENTED: ICD-10-PCS | Mod: CPTII,S$GLB,, | Performed by: STUDENT IN AN ORGANIZED HEALTH CARE EDUCATION/TRAINING PROGRAM

## 2023-03-06 PROCEDURE — 3074F PR MOST RECENT SYSTOLIC BLOOD PRESSURE < 130 MM HG: ICD-10-PCS | Mod: CPTII,S$GLB,, | Performed by: STUDENT IN AN ORGANIZED HEALTH CARE EDUCATION/TRAINING PROGRAM

## 2023-03-06 PROCEDURE — 1159F PR MEDICATION LIST DOCUMENTED IN MEDICAL RECORD: ICD-10-PCS | Mod: CPTII,S$GLB,, | Performed by: STUDENT IN AN ORGANIZED HEALTH CARE EDUCATION/TRAINING PROGRAM

## 2023-03-06 PROCEDURE — 3008F PR BODY MASS INDEX (BMI) DOCUMENTED: ICD-10-PCS | Mod: CPTII,S$GLB,, | Performed by: STUDENT IN AN ORGANIZED HEALTH CARE EDUCATION/TRAINING PROGRAM

## 2023-03-06 PROCEDURE — 1160F RVW MEDS BY RX/DR IN RCRD: CPT | Mod: CPTII,S$GLB,, | Performed by: STUDENT IN AN ORGANIZED HEALTH CARE EDUCATION/TRAINING PROGRAM

## 2023-03-06 PROCEDURE — 99215 PR OFFICE/OUTPT VISIT, EST, LEVL V, 40-54 MIN: ICD-10-PCS | Mod: S$GLB,,, | Performed by: STUDENT IN AN ORGANIZED HEALTH CARE EDUCATION/TRAINING PROGRAM

## 2023-03-06 PROCEDURE — 99215 OFFICE O/P EST HI 40 MIN: CPT | Mod: S$GLB,,, | Performed by: STUDENT IN AN ORGANIZED HEALTH CARE EDUCATION/TRAINING PROGRAM

## 2023-03-06 PROCEDURE — 3079F PR MOST RECENT DIASTOLIC BLOOD PRESSURE 80-89 MM HG: ICD-10-PCS | Mod: CPTII,S$GLB,, | Performed by: STUDENT IN AN ORGANIZED HEALTH CARE EDUCATION/TRAINING PROGRAM

## 2023-03-06 PROCEDURE — 3079F DIAST BP 80-89 MM HG: CPT | Mod: CPTII,S$GLB,, | Performed by: STUDENT IN AN ORGANIZED HEALTH CARE EDUCATION/TRAINING PROGRAM

## 2023-03-06 PROCEDURE — 99999 PR PBB SHADOW E&M-EST. PATIENT-LVL V: CPT | Mod: PBBFAC,,, | Performed by: STUDENT IN AN ORGANIZED HEALTH CARE EDUCATION/TRAINING PROGRAM

## 2023-03-06 PROCEDURE — 1159F MED LIST DOCD IN RCRD: CPT | Mod: CPTII,S$GLB,, | Performed by: STUDENT IN AN ORGANIZED HEALTH CARE EDUCATION/TRAINING PROGRAM

## 2023-03-06 PROCEDURE — 3074F SYST BP LT 130 MM HG: CPT | Mod: CPTII,S$GLB,, | Performed by: STUDENT IN AN ORGANIZED HEALTH CARE EDUCATION/TRAINING PROGRAM

## 2023-03-06 PROCEDURE — 3008F BODY MASS INDEX DOCD: CPT | Mod: CPTII,S$GLB,, | Performed by: STUDENT IN AN ORGANIZED HEALTH CARE EDUCATION/TRAINING PROGRAM

## 2023-03-06 PROCEDURE — 99999 PR PBB SHADOW E&M-EST. PATIENT-LVL V: ICD-10-PCS | Mod: PBBFAC,,, | Performed by: STUDENT IN AN ORGANIZED HEALTH CARE EDUCATION/TRAINING PROGRAM

## 2023-03-06 RX ORDER — CLINDAMYCIN AND BENZOYL PEROXIDE 10; 50 MG/G; MG/G
GEL TOPICAL 2 TIMES DAILY
Qty: 50 G | Refills: 5 | Status: SHIPPED | OUTPATIENT
Start: 2023-03-06 | End: 2023-07-14 | Stop reason: CLARIF

## 2023-03-06 RX ORDER — HYDROXYZINE PAMOATE 25 MG/1
25 CAPSULE ORAL EVERY 8 HOURS PRN
Qty: 30 CAPSULE | Refills: 0 | Status: SHIPPED | OUTPATIENT
Start: 2023-03-06 | End: 2023-07-14 | Stop reason: CLARIF

## 2023-03-06 RX ORDER — DIAZEPAM 2 MG/1
2 TABLET ORAL DAILY PRN
COMMUNITY
Start: 2022-10-15 | End: 2023-03-06

## 2023-03-06 RX ORDER — DIAZEPAM 2 MG/1
2 TABLET ORAL DAILY PRN
Status: CANCELLED | OUTPATIENT
Start: 2023-03-06

## 2023-03-06 NOTE — PROGRESS NOTES
Plan:      Antonietta was seen today for establish care and headache.    Diagnoses and all orders for this visit:    Adhesive capsulitis of right shoulder  -     Ambulatory referral/consult to Physical/Occupational Therapy; Future    Panic attacks  -     hydrOXYzine pamoate (VISTARIL) 25 MG Cap; Take 1 capsule (25 mg total) by mouth every 8 (eight) hours as needed (Anxiety).    Developmental venous anomaly of cerebrum: No intervention indicated at this time.    Other migraine without status migrainosus, not intractable  -     Ambulatory referral/consult to Neurology; Future    Mixed hyperlipidemia: Continue Lipitor 10 mg qhs.    Preventative health care  -     CBC Auto Differential; Future  -     Comprehensive Metabolic Panel; Future    Encounter for screening for diabetes mellitus  -     Hemoglobin A1C; Future    Encounter for screening for lipid disorder  -     Lipid Panel; Future    Left flank pain  -     US Retroperitoneal Complete; Future    Other orders  -     clindamycin-benzoyl peroxide (BENZACLIN) gel; Apply topically 2 (two) times daily.    Follow up in about 4 weeks (around 4/3/2023), or if symptoms worsen or fail to improve.    Leyda Hartmann MD  03/06/2023    Subjective:      Patient ID: Antonietta Wright is a 53 y.o. female    Chief Complaint   Patient presents with    Establish Care     Pt states she would like second opinion about scar on brain /MRI few months ago with .    Headache     Pt states she sensitive to light x 2 years     HPI  53 y.o. female with a PMHx as documented below presents to clinic today for the following:    Adhesive capsulitis of the right shoulder:   - 2/2 falling out of truck a few months ago  - Conservative treatment only at this point, she has not yet been to physical therapy yet    Panic disorder:   - Not currently interested in a daily medication   - Previously prescribed Valium and Klonopin PRN, would like to avoid benzos, interested in other PRN  options    Developmental venous anomaly of cerebrum:   - Noted on MRI brain (6/16/22), evaluated by NSGY, no intervention needed at this time    Frequent headaches:   - On chart review, pt w/ Hx of left-sided headaches starting in the occipital region and subsequently radiating to behind the left eye; associated symptoms include R eye twitching and tinnitus (L > R)  - Seen by NSGY, no intervention indicated  - Referral to Neuro pending    HLD:   - Lipitor 10 mg qhs    ROS  Constitutional:  Negative for chills and fever.   Respiratory:  Negative for shortness of breath.    Cardiovascular:  Negative for chest pain.   Gastrointestinal:  Negative for abdominal pain, constipation, diarrhea, nausea and vomiting.     Current Outpatient Medications   Medication Instructions    atorvastatin (LIPITOR) 10 mg, Oral, Nightly    clindamycin-benzoyl peroxide (BENZACLIN) gel Topical (Top), 2 times daily    hydrOXYzine pamoate (VISTARIL) 25 mg, Oral, Every 8 hours PRN      Past Medical History:   Diagnosis Date    Age-related nuclear cataract, bilateral     Breast injury     Colon polyps     Developmental venous anomaly of cerebrum 3/6/2023    Fibrocystic breast     Gallstones     Generalized headaches 4/2/2014    Glaucomatous optic atrophy, bilateral     Hemangioma of liver 07/2015    x2, left hepatic lobe and posterior segment of the right hepatic lobe    Hypermetropia, bilateral     Keratoconjunctivitis sicca not specified as Sjogren's, bilateral     Lung nodules 03/25/2020    x2: 3 mm RML, 5 mm RLL    Mixed hyperlipidemia 3/6/2023    Mixed hyperlipidemia 3/6/2023    Osteopenia     Papilloma of left breast 10/18/2016    Regular astigmatism, bilateral     Thyroid disease     Tinnitus aurium, bilateral 04/09/2019    S/p audiology and ENT eval    Uterine leiomyoma     Vitreous degeneration, bilateral         Objective:      Vitals:    03/06/23 1040   BP: 124/84   BP Location: Left arm   Patient Position: Sitting   Pulse: 73   Resp:  "16   SpO2: 99%   Weight: 71.1 kg (156 lb 12 oz)   Height: 5' 3" (1.6 m)     Body mass index is 27.77 kg/m².    Physical Exam   Constitutional:       General: No acute distress.  HENT:      Head: Normocephalic and atraumatic.   Pulmonary:      Effort: Pulmonary effort is normal. No respiratory distress.   Neurological:      General: No focal deficit present.      Mental Status: Alert and oriented to person, place, and time. Mental status is at baseline.    Assessment:       1. Adhesive capsulitis of right shoulder    2. Panic attacks    3. Developmental venous anomaly of cerebrum    4. Other migraine without status migrainosus, not intractable    5. Mixed hyperlipidemia    6. Preventative health care    7. Encounter for screening for diabetes mellitus    8. Encounter for screening for lipid disorder    9. Left flank pain        Leyda Hartmann MD  [facility]  Office: [phone]   Fax: [phone]  03/06/2023      Disclaimer: This note was partly generated using dictation software which may occasionally result in transcription errors.    Total time spent on this encounter includes face to face time and non-face to face time preparing to see the patient (eg, review of tests), obtaining and/or reviewing separately obtained history, documenting clinical information in the electronic or other health record, independently interpreting results, and communicating results to the patient/family/caregiver, or care coordinator.      "

## 2023-03-08 DIAGNOSIS — Z12.31 OTHER SCREENING MAMMOGRAM: ICD-10-CM

## 2023-03-09 ENCOUNTER — LAB VISIT (OUTPATIENT)
Dept: LAB | Facility: HOSPITAL | Age: 53
End: 2023-03-09
Payer: COMMERCIAL

## 2023-03-09 DIAGNOSIS — Z13.1 ENCOUNTER FOR SCREENING FOR DIABETES MELLITUS: ICD-10-CM

## 2023-03-09 DIAGNOSIS — Z13.220 ENCOUNTER FOR SCREENING FOR LIPID DISORDER: ICD-10-CM

## 2023-03-09 DIAGNOSIS — Z00.00 PREVENTATIVE HEALTH CARE: ICD-10-CM

## 2023-03-09 LAB
BASOPHILS # BLD AUTO: 0.03 K/UL (ref 0–0.2)
BASOPHILS NFR BLD: 0.6 % (ref 0–1.9)
DIFFERENTIAL METHOD: NORMAL
EOSINOPHIL # BLD AUTO: 0.2 K/UL (ref 0–0.5)
EOSINOPHIL NFR BLD: 3.2 % (ref 0–8)
ERYTHROCYTE [DISTWIDTH] IN BLOOD BY AUTOMATED COUNT: 12.1 % (ref 11.5–14.5)
HCT VFR BLD AUTO: 40.3 % (ref 37–48.5)
HGB BLD-MCNC: 13.2 G/DL (ref 12–16)
IMM GRANULOCYTES # BLD AUTO: 0.01 K/UL (ref 0–0.04)
IMM GRANULOCYTES NFR BLD AUTO: 0.2 % (ref 0–0.5)
LYMPHOCYTES # BLD AUTO: 1.7 K/UL (ref 1–4.8)
LYMPHOCYTES NFR BLD: 35.4 % (ref 18–48)
MCH RBC QN AUTO: 30.3 PG (ref 27–31)
MCHC RBC AUTO-ENTMCNC: 32.8 G/DL (ref 32–36)
MCV RBC AUTO: 92 FL (ref 82–98)
MONOCYTES # BLD AUTO: 0.4 K/UL (ref 0.3–1)
MONOCYTES NFR BLD: 8.2 % (ref 4–15)
NEUTROPHILS # BLD AUTO: 2.5 K/UL (ref 1.8–7.7)
NEUTROPHILS NFR BLD: 52.4 % (ref 38–73)
NRBC BLD-RTO: 0 /100 WBC
PLATELET # BLD AUTO: 305 K/UL (ref 150–450)
PMV BLD AUTO: 9.2 FL (ref 9.2–12.9)
RBC # BLD AUTO: 4.36 M/UL (ref 4–5.4)
WBC # BLD AUTO: 4.74 K/UL (ref 3.9–12.7)

## 2023-03-09 PROCEDURE — 80053 COMPREHEN METABOLIC PANEL: CPT | Performed by: STUDENT IN AN ORGANIZED HEALTH CARE EDUCATION/TRAINING PROGRAM

## 2023-03-09 PROCEDURE — 83036 HEMOGLOBIN GLYCOSYLATED A1C: CPT | Performed by: STUDENT IN AN ORGANIZED HEALTH CARE EDUCATION/TRAINING PROGRAM

## 2023-03-09 PROCEDURE — 36415 COLL VENOUS BLD VENIPUNCTURE: CPT | Mod: PN | Performed by: STUDENT IN AN ORGANIZED HEALTH CARE EDUCATION/TRAINING PROGRAM

## 2023-03-09 PROCEDURE — 80061 LIPID PANEL: CPT | Performed by: STUDENT IN AN ORGANIZED HEALTH CARE EDUCATION/TRAINING PROGRAM

## 2023-03-09 PROCEDURE — 85025 COMPLETE CBC W/AUTO DIFF WBC: CPT | Performed by: STUDENT IN AN ORGANIZED HEALTH CARE EDUCATION/TRAINING PROGRAM

## 2023-03-10 LAB
ALBUMIN SERPL BCP-MCNC: 3.8 G/DL (ref 3.5–5.2)
ALP SERPL-CCNC: 90 U/L (ref 55–135)
ALT SERPL W/O P-5'-P-CCNC: 13 U/L (ref 10–44)
ANION GAP SERPL CALC-SCNC: 10 MMOL/L (ref 8–16)
AST SERPL-CCNC: 18 U/L (ref 10–40)
BILIRUB SERPL-MCNC: 0.4 MG/DL (ref 0.1–1)
BUN SERPL-MCNC: 14 MG/DL (ref 6–20)
CALCIUM SERPL-MCNC: 9.4 MG/DL (ref 8.7–10.5)
CHLORIDE SERPL-SCNC: 103 MMOL/L (ref 95–110)
CHOLEST SERPL-MCNC: 271 MG/DL (ref 120–199)
CHOLEST/HDLC SERPL: 4 {RATIO} (ref 2–5)
CO2 SERPL-SCNC: 27 MMOL/L (ref 23–29)
CREAT SERPL-MCNC: 0.8 MG/DL (ref 0.5–1.4)
EST. GFR  (NO RACE VARIABLE): >60 ML/MIN/1.73 M^2
ESTIMATED AVG GLUCOSE: 97 MG/DL (ref 68–131)
GLUCOSE SERPL-MCNC: 81 MG/DL (ref 70–110)
HBA1C MFR BLD: 5 % (ref 4–5.6)
HDLC SERPL-MCNC: 68 MG/DL (ref 40–75)
HDLC SERPL: 25.1 % (ref 20–50)
LDLC SERPL CALC-MCNC: 192.6 MG/DL (ref 63–159)
NONHDLC SERPL-MCNC: 203 MG/DL
POTASSIUM SERPL-SCNC: 4.3 MMOL/L (ref 3.5–5.1)
PROT SERPL-MCNC: 7 G/DL (ref 6–8.4)
SODIUM SERPL-SCNC: 140 MMOL/L (ref 136–145)
TRIGL SERPL-MCNC: 52 MG/DL (ref 30–150)

## 2023-03-11 ENCOUNTER — PATIENT MESSAGE (OUTPATIENT)
Dept: FAMILY MEDICINE | Facility: CLINIC | Age: 53
End: 2023-03-11
Payer: COMMERCIAL

## 2023-03-11 NOTE — TELEPHONE ENCOUNTER
No new care gaps identified.  [facility] Embedded Care Gaps. Reference number: [id number]. 3/11/2023   8:49:25 AM CST

## 2023-03-13 RX ORDER — ATORVASTATIN CALCIUM 10 MG/1
10 TABLET, FILM COATED ORAL NIGHTLY
Qty: 30 TABLET | Refills: 11 | Status: SHIPPED | OUTPATIENT
Start: 2023-03-13
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
## 2023-03-21 ENCOUNTER — CLINICAL SUPPORT (OUTPATIENT)
Dept: REHABILITATION | Facility: HOSPITAL | Age: 53
End: 2023-03-21
Attending: STUDENT IN AN ORGANIZED HEALTH CARE EDUCATION/TRAINING PROGRAM
Payer: COMMERCIAL

## 2023-03-21 DIAGNOSIS — M75.01 ADHESIVE CAPSULITIS OF RIGHT SHOULDER: ICD-10-CM

## 2023-03-21 DIAGNOSIS — R26.89 DECREASED FUNCTIONAL MOBILITY: ICD-10-CM

## 2023-03-21 DIAGNOSIS — M25.511 CHRONIC RIGHT SHOULDER PAIN: ICD-10-CM

## 2023-03-21 DIAGNOSIS — G89.29 CHRONIC RIGHT SHOULDER PAIN: ICD-10-CM

## 2023-03-21 DIAGNOSIS — M25.60 RANGE OF MOTION DEFICIT: ICD-10-CM

## 2023-03-21 DIAGNOSIS — R29.3 POOR POSTURE: ICD-10-CM

## 2023-03-21 PROCEDURE — 97110 THERAPEUTIC EXERCISES: CPT | Mod: PN

## 2023-03-21 PROCEDURE — 97161 PT EVAL LOW COMPLEX 20 MIN: CPT | Mod: PN

## 2023-03-21 NOTE — PATIENT INSTRUCTIONS
Repeat motion 10 times  Perform 2 sets per day    sourced from: Neural Mobilization: Examination & Intervention Strategies  Ronald Ingram, PT, DPT, OCS, Cert. MDT

## 2023-03-21 NOTE — PLAN OF CARE
[facility]  Physical Therapy Initial Evaluation    Name: Antonietta Hanson [facility]  Clinic Number: [id number]    Therapy Diagnosis:   Encounter Diagnoses   Name Primary?    Adhesive capsulitis of right shoulder     Chronic right shoulder pain     Range of motion deficit     Poor posture     Decreased functional mobility      Physician: Leyda Hartmann MD    Physician Orders: PT Eval and Treat   Medical Diagnosis from Referral: Adhesive capsulitis of right shoulder  Evaluation Date: 3/21/2023  Authorization Period Expiration: 12/21/23  Plan of Care Expiration: 5/19/23  Visit # / Visits authorized: 1/ 1    Time In: 10:10  Time Out: 11:00  Total Billable Time: 50 minutes    Precautions: Standard    Subjective   Date of onset: 5 years ago  History of current condition - Antonietta reports: she was on a ladder and her feet slipped out and she caught herself with her R arm.  The pain went away after a year.  She states she fell out of a truck and landed on her R shoulder about 6 months ago.  She states she has pain and difficulty reaching backwards and ER.  She states her R shoulder is getting better, but her ROM is limited.     Medical History:   Past Medical History:   Diagnosis Date    Age-related nuclear cataract, bilateral     Breast injury     Colon polyps     Developmental venous anomaly of cerebrum 3/6/2023    Fibrocystic breast     Gallstones     Generalized headaches 4/2/2014    Glaucomatous optic atrophy, bilateral     Hemangioma of liver 07/2015    x2, left hepatic lobe and posterior segment of the right hepatic lobe    Hypermetropia, bilateral     Keratoconjunctivitis sicca not specified as Sjogren's, bilateral     Lung nodules 03/25/2020    x2: 3 mm RML, 5 mm RLL    Mixed hyperlipidemia 3/6/2023    Mixed hyperlipidemia 3/6/2023    Osteopenia     Papilloma of left breast 10/18/2016    Regular astigmatism, bilateral     Thyroid disease     Tinnitus aurium, bilateral 04/09/2019    S/p  audiology and ENT eval    Uterine leiomyoma     Vitreous degeneration, bilateral        Surgical History:   Antonietta Wright  has a past surgical history that includes Ovarian cyst removal; IVF;  section; Breast biopsy (Left, ); and Breast cyst excision.    Medications:   Antonietta has a current medication list which includes the following prescription(s): atorvastatin, clindamycin-benzoyl peroxide, and hydroxyzine pamoate.    Allergies:   Review of patient's allergies indicates:   Allergen Reactions    Tylenol-codeine #3 [acetaminophen-codeine] Other (See Comments)     Patient has liver tumors.     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    Pain:  Current 2/10, worst 7/10, best 1/10   Location:  lateral R shoulder and up into upper trap  Description: Aching and Tight  Aggravating Factors: reaching behind, ER, upper body dressing  Easing Factors: rest, heat    Pts goals: get full ROM of R shoulder, be able to dress without pain, be able to stand up straight without pain, be able to cathy    Objective     Observation: pt is pleasant and cooperative    Posture: fwd head, rounded shoulders      Passive Range of Motion: (deg)  Shoulder Right   Flexion 152   Abduction 105   ER at 0 55   ER at 90 60      Active Range of Motion: (deg)  Shoulder Left Right   Flexion 177 159 pain to lateral sh    Abduction 170 106 pain   ER at 0 65 60 pain to anterior sh   ER at 90 100 Unable to perform   IR T5 T8     Upper Extremity Strength--will test next visit    Special Tests:  AC Joint Left Right   Empty Can Test - +   Speed's test - +       Joint Mobility: hypomobile R GH jt posterior and anterior glides    Palpation: TTP and tightness to R bicep tendon (long and short head)    Sensation: grossly intact to light touch    Scapular Control/Dyskinesis:    Normal / Subtle / Obvious  Comments    Left  normal      Right  subtle       FOTO:   CMS Impairment/Limitation/Restriction for FOTO Shoulder Survey  Status Limitation G-Code CMS Severity Modifier  Intake 60% 40% Current Status CK - At least 40 percent but less than 60 percent  Predicted 69% 31% Goal Status+ CJ - At least 20 percent but less than 40 percent    TREATMENT   Treatment Time In: 10:40  Treatment Time Out: 11:00  Total Treatment time separate from Evaluation: 20 minutes    Antonietta received therapeutic exercises to develop ROM and posture for 10 minutes including:  Supine AA sh flex 1# dowel 10x10 sec  Supine AA sh ER at 0 deg abd 1# dowel 10x10 sec  Posterior sh rolls x10  PROM R bicep stretch in supine (pt reported pain to volar side of R forearm from elbow to wrist)    May add: test UE strength, pulleys for flex and abd, seated scap retraction, PROM, R GH jt gena Mane received the following manual therapy techniques: Friction Massage were applied to the: R bicep tendon for 5 minutes, including:  Cross friction to R bicep tendon    Antonietta participated in neuromuscular re-education activities to improve: Posture and nerve glides for 5 minutes. The following activities were included:  Ulnar n glides x10 (cues to stay in symptom free ROM)    Home Exercises and Patient Education Provided    Education provided:   - role of PT  -compliance with HEP  -perform HEP in pain free ROM  -use of detachable keyboard for improved ergonomics and posture    Written  Home Exercises Provided: yes.  Exercises were reviewed and Antonietta was able to demonstrate them prior to the end of the session.  Antonietta demonstrated good  understanding of the education provided.     See EMR under Patient Instructions for exercises provided 3/21/23.    Assessment   Antonietta is a 53 y.o. female referred to outpatient Physical Therapy with a medical diagnosis of Adhesive capsulitis of right shoulder. Pt presents with R shoulder pain, decreased R shoulder ROM, poor posture, difficulty with reaching and upper body dressing.  Pt will benefit from R GH jt mobs, progressive ROM exercises for R shoulder, postural strengthening.    Pt prognosis is Good.   Pt will benefit from skilled outpatient Physical Therapy to address the deficits stated above and in the chart below, provide pt/family education, and to maximize pt's level of independence.     Plan of care discussed with patient: Yes  Pt's spiritual, cultural and educational needs considered and patient is agreeable to the plan of care and goals as stated below:     Anticipated Barriers for therapy: none    Medical Necessity is demonstrated by the following  History  Co-morbidities and personal factors that may impact the plan of care Co-morbidities:   difficulty sleeping    Personal Factors:   no deficits     moderate   Examination  Body Structures and Functions, activity limitations and participation restrictions that may impact the plan of care Body Regions:   upper extremities  trunk    Body Systems:    ROM  strength    Participation Restrictions:        Activity limitations:   Learning and applying knowledge  no deficits    General Tasks and Commands  no deficits    Communication  no deficits    Mobility  lifting and carrying objects    Self care  washing oneself (bathing, drying, washing hands)  caring for body parts (brushing teeth, shaving, grooming)  dressing    Domestic Life  shopping  cooking  doing house work (cleaning house, washing dishes,  laundry)    Interactions/Relationships  no deficits    Life Areas  no deficits    Community and Social Life  community life  recreation and leisure         high   Clinical Presentation stable and uncomplicated low   Decision Making/ Complexity Score: low       GOALS: Short Term Goals:  4 weeks (progressing, not met)  1.Report decreased    R shoulder    pain  <   / =  4  /10 at its worst  to increase tolerance for ADLs  2. Increased R shoulder flexion AROM to at least 170 deg for increased ease with ADLs.  3. Increased R shoulder abduction AROM to at least 130 deg for increased ease with ADLs.  4.  Increased shoulder ER at 90 deg AROM to at least 70 deg for increased ease with ADLs.  5. Pt to tolerate HEP to improve ROM and independence with ADL's    Long Term Goals: 8 weeks (progressing, not met)  1.Report decreased    R shoulder    pain  <   / =  4  /10 at its worst  to increase tolerance for ADLs  2. Increased R shoulder flexion AROM to at least 175 deg for increased ease with ADLs.  3. Increased R shoulder abduction AROM to at least 150 deg for increased ease with ADLs.  4.  Increased shoulder ER at 90 deg AROM to at least 90 deg for increased ease with ADLs.  5. Pt to tolerate HEP to improve ROM and independence with ADL's  6. Pt will demonstrate periscap strength of at least 4/5 in all planes for increased ease with ADLs.      Plan   Plan of care Certification: 3/21/2023 to 5/19/2023.    Outpatient Physical Therapy 2 times weekly for 8 weeks to include the following interventions: Electrical Stimulation  , Manual Therapy, Moist Heat/ Ice, Neuromuscular Re-ed, Patient Education, Self Care, Therapeutic Activities, Therapeutic Exercise, and dry needling.     Gladis Mina, PT

## 2023-03-27 ENCOUNTER — CLINICAL SUPPORT (OUTPATIENT)
Dept: REHABILITATION | Facility: HOSPITAL | Age: 53
End: 2023-03-27
Attending: STUDENT IN AN ORGANIZED HEALTH CARE EDUCATION/TRAINING PROGRAM
Payer: COMMERCIAL

## 2023-03-27 DIAGNOSIS — M25.60 RANGE OF MOTION DEFICIT: ICD-10-CM

## 2023-03-27 DIAGNOSIS — G89.29 CHRONIC RIGHT SHOULDER PAIN: Primary | ICD-10-CM

## 2023-03-27 DIAGNOSIS — R26.89 DECREASED FUNCTIONAL MOBILITY: ICD-10-CM

## 2023-03-27 DIAGNOSIS — R29.3 POOR POSTURE: ICD-10-CM

## 2023-03-27 DIAGNOSIS — M25.511 CHRONIC RIGHT SHOULDER PAIN: Primary | ICD-10-CM

## 2023-03-27 PROCEDURE — 97140 MANUAL THERAPY 1/> REGIONS: CPT | Mod: PN

## 2023-03-27 PROCEDURE — 97110 THERAPEUTIC EXERCISES: CPT | Mod: PN

## 2023-03-27 NOTE — PROGRESS NOTES
[facility]   Physical Therapy Treatment Note     Name: Antonietta Hanson [facility]  Clinic Number: [id number]    Therapy Diagnosis:   Encounter Diagnoses   Name Primary?    Chronic right shoulder pain Yes    Range of motion deficit     Poor posture     Decreased functional mobility      Physician: Leyda Hartmann MD    Visit Date: 3/27/2023    Physician Orders: PT Eval and Treat   Medical Diagnosis from Referral: Adhesive capsulitis of right shoulder  Evaluation Date: 3/21/2023  Authorization Period Expiration: 12/31/23  Plan of Care Expiration: 5/19/23  Visit # / Visits authorized: 1/ 20    Time In: 10:05  Time Out: 10:45  Total Billable Time: 40 minutes    Precautions: Standard    PTA Visit #: 0/5       SUBJECTIVE     Pt reports: her R shoulder has been feeling better overall.  She did some work at her house in [place] over the weekend.  She states she has slight pain, but applied ice pack and it decreased.  She was compliant with home exercise program.  Response to previous treatment: did well  Functional change: improved R shoulder ROM    Pain: 2-3/10  Location: right shoulder     OBJECTIVE     Upper Extremity Strength  (R) UE  (L) UE    Shoulder flexion: 3-/5 Shoulder flexion: 4+/5   Shoulder Abduction: 3-/5 Shoulder abduction: 4/5   Shoulder ER at side 4/5 pain Shoulder ER 5/5   Shoulder IR 4/5 pain to bicep Shoulder IR 5/5   Elbow flexion: 4+/5 pain to post sh Elbow flexion: 5/5   Elbow extension: 5/5 Elbow extension: 5/5   Lower Trap 3-/5 Lower Trap 3/5   Middle Trap 3-/5 pain Middle Trap 3+/5   Rhomboids 3+/5 Rhomboids 4+/5        Treatment     Antonietta received the treatments listed below:      therapeutic exercises to develop strength, ROM, flexibility, and posture for 27 minutes including:  Supine AA sh flex 1# dowel (vertical) 10x10 sec  Supine AA sh ER at 0 deg abd 1# dowel 10x10 sec  Supine AA sh ER at 45 deg 1# dowel 10x10 sec  Posterior sh rolls x10  PROM R bicep  stretch in supine (pt reported pain to volar side of R forearm from elbow to wrist)  Pulleys for flex and scaption x2 min ea    May add: seated scap retraction    Antonietta received the following manual therapy techniques: Friction Massage were applied to the: R bicep tendon for 8 minutes, including:  Cross friction to R bicep tendon  GH jt mobs in all planes grd II-III    Antonietta participated in neuromuscular re-education activities to improve: Posture and nerve glides for 5 minutes. The following activities were included:  NP Ulnar n glides x10 (cues to stay in symptom free ROM)  Prone scap depression x10, 5 sec hold      Patient Education and Home Exercises     Home Exercises Provided and Patient Education Provided     Education provided:   - perform ex in pain free ROM  Pt gave verbal understanding to all education provided     Written Home Exercises Provided: yes. Exercises were reviewed and Antonietta was able to demonstrate them prior to the end of the session.  Antonietta demonstrated good  understanding of the education provided. See EMR under Patient Instructions for exercises provided during therapy sessions    ASSESSMENT     Pt demonstrated improved overall R shoulder ROM today.  R shoulder abduction limited so started with scaption for pulleys.  Attempted scap retraction, but stopped with to symptoms down R bicep.  Symptoms resolved with rest.  She will continue to benefit from PT for improved R shoulder ROM and UE strength for improved functional mobility.    Antonietta Is progressing well towards her goals.   Pt prognosis is Good.     Pt will continue to benefit from skilled outpatient physical therapy to address the deficits listed in the problem list box on initial evaluation, provide pt/family education and to maximize pt's level of independence in the home and community environment.     Pt's spiritual, cultural and educational needs considered and pt agreeable to plan of care and goals.     Anticipated  barriers to physical therapy: none    Goals:   Short Term Goals:  4 weeks (progressing, not met)  1.Report decreased    R shoulder    pain  <   / =  4  /10 at its worst  to increase tolerance for ADLs  2. Increased R shoulder flexion AROM to at least 170 deg for increased ease with ADLs.  3. Increased R shoulder abduction AROM to at least 130 deg for increased ease with ADLs.  4.  Increased shoulder ER at 90 deg AROM to at least 70 deg for increased ease with ADLs.  5. Pt to tolerate HEP to improve ROM and independence with ADL's    Long Term Goals: 8 weeks (progressing, not met)  1.Report decreased    R shoulder    pain  <   / =  4  /10 at its worst  to increase tolerance for ADLs  2. Increased R shoulder flexion AROM to at least 175 deg for increased ease with ADLs.  3. Increased R shoulder abduction AROM to at least 150 deg for increased ease with ADLs.  4.  Increased shoulder ER at 90 deg AROM to at least 90 deg for increased ease with ADLs.  5. Pt to tolerate HEP to improve ROM and independence with ADL's  6. Pt will demonstrate periscap strength of at least 4/5 in all planes for increased ease with ADLs.      PLAN     Continue PT towards established goals.  Progress R shoulder ROM and UE/periscap strength and stabilization as tolerated.    Plan of care Certification: 3/21/2023 to 5/19/2023.    Outpatient Physical Therapy 2 times weekly for 8 weeks to include the following interventions: Electrical Stimulation  , Manual Therapy, Moist Heat/ Ice, Neuromuscular Re-ed, Patient Education, Self Care, Therapeutic Activities, Therapeutic Exercise, and dry needling.       Gladis Mina, PT

## 2023-03-29 ENCOUNTER — OFFICE VISIT (OUTPATIENT)
Dept: NEUROLOGY | Facility: CLINIC | Age: 53
End: 2023-03-29
Payer: COMMERCIAL

## 2023-03-29 VITALS — SYSTOLIC BLOOD PRESSURE: 131 MMHG | DIASTOLIC BLOOD PRESSURE: 92 MMHG | RESPIRATION RATE: 20 BRPM | HEART RATE: 72 BPM

## 2023-03-29 DIAGNOSIS — M79.18 MYOFASCIAL PAIN: ICD-10-CM

## 2023-03-29 DIAGNOSIS — H53.149 PHOTOPHOBIA: Primary | ICD-10-CM

## 2023-03-29 DIAGNOSIS — G44.209 TENSION HEADACHE: ICD-10-CM

## 2023-03-29 PROCEDURE — 1159F MED LIST DOCD IN RCRD: CPT | Mod: CPTII,S$GLB,, | Performed by: PHYSICIAN ASSISTANT

## 2023-03-29 PROCEDURE — 3044F PR MOST RECENT HEMOGLOBIN A1C LEVEL <7.0%: ICD-10-PCS | Mod: CPTII,S$GLB,, | Performed by: PHYSICIAN ASSISTANT

## 2023-03-29 PROCEDURE — 3044F HG A1C LEVEL LT 7.0%: CPT | Mod: CPTII,S$GLB,, | Performed by: PHYSICIAN ASSISTANT

## 2023-03-29 PROCEDURE — 1159F PR MEDICATION LIST DOCUMENTED IN MEDICAL RECORD: ICD-10-PCS | Mod: CPTII,S$GLB,, | Performed by: PHYSICIAN ASSISTANT

## 2023-03-29 PROCEDURE — 99999 PR PBB SHADOW E&M-EST. PATIENT-LVL III: CPT | Mod: PBBFAC,,, | Performed by: PHYSICIAN ASSISTANT

## 2023-03-29 PROCEDURE — 1160F RVW MEDS BY RX/DR IN RCRD: CPT | Mod: CPTII,S$GLB,, | Performed by: PHYSICIAN ASSISTANT

## 2023-03-29 PROCEDURE — 99205 OFFICE O/P NEW HI 60 MIN: CPT | Mod: S$GLB,,, | Performed by: PHYSICIAN ASSISTANT

## 2023-03-29 PROCEDURE — 99999 PR PBB SHADOW E&M-EST. PATIENT-LVL III: ICD-10-PCS | Mod: PBBFAC,,, | Performed by: PHYSICIAN ASSISTANT

## 2023-03-29 PROCEDURE — 1160F PR REVIEW ALL MEDS BY PRESCRIBER/CLIN PHARMACIST DOCUMENTED: ICD-10-PCS | Mod: CPTII,S$GLB,, | Performed by: PHYSICIAN ASSISTANT

## 2023-03-29 PROCEDURE — 3080F DIAST BP >= 90 MM HG: CPT | Mod: CPTII,S$GLB,, | Performed by: PHYSICIAN ASSISTANT

## 2023-03-29 PROCEDURE — 3080F PR MOST RECENT DIASTOLIC BLOOD PRESSURE >= 90 MM HG: ICD-10-PCS | Mod: CPTII,S$GLB,, | Performed by: PHYSICIAN ASSISTANT

## 2023-03-29 PROCEDURE — 3075F SYST BP GE 130 - 139MM HG: CPT | Mod: CPTII,S$GLB,, | Performed by: PHYSICIAN ASSISTANT

## 2023-03-29 PROCEDURE — 3075F PR MOST RECENT SYSTOLIC BLOOD PRESS GE 130-139MM HG: ICD-10-PCS | Mod: CPTII,S$GLB,, | Performed by: PHYSICIAN ASSISTANT

## 2023-03-29 PROCEDURE — 99205 PR OFFICE/OUTPT VISIT, NEW, LEVL V, 60-74 MIN: ICD-10-PCS | Mod: S$GLB,,, | Performed by: PHYSICIAN ASSISTANT

## 2023-03-29 NOTE — PATIENT INSTRUCTIONS
"          Suggested that the patient research out OTC supplements (stressed NOT FDA regulated and should take at own risk).    To help prevent a headache:   Petadolex (butterbur root)  Vitamin B2 (riboflavin)  CoQ10  Magnesium  "Migraeeze" which is petadolex/Vitamin B2/tyler  "Dolovent" which is magnesium/Vitamin B2/coq10    * all of the above can be found locally in a variety of shops or on the internet       Consider the Evolva device, www.Evolva[employer], please research, this is TENs unit designed in [place] and was FDA approved in the early 2010s for migraines.     "

## 2023-03-29 NOTE — PROGRESS NOTES
[facility]   Physical Therapy Treatment Note     Name: Antonietta Hanson [facility]  Clinic Number: [id number]    Therapy Diagnosis:   Encounter Diagnoses   Name Primary?    Chronic right shoulder pain Yes    Range of motion deficit     Poor posture     Decreased functional mobility        Physician: Leyda Hartmann MD    Visit Date: 3/30/2023    Physician Orders: PT Eval and Treat   Medical Diagnosis from Referral: Adhesive capsulitis of right shoulder  Evaluation Date: 3/21/2023  Authorization Period Expiration: 12/31/23  Plan of Care Expiration: 5/19/23  Visit # / Visits authorized: 2/ 20    Time In: 10:05  Time Out: 10:55  Total Billable Time: 39 minutes + ice    Precautions: Standard    PTA Visit #: 0/5       SUBJECTIVE     Pt reports: she wakes up with numbness and tingling, but it subsides within a couple of minutes after getting up out of bed.  She states she dug a hold yesterday to plant a palm tree.  She was compliant with home exercise program.  Response to previous treatment: did well  Functional change: improved R shoulder ROM    Pain: 4/10  Location: right shoulder     OBJECTIVE       Treatment     Antonietta received the treatments listed below:      therapeutic exercises to develop strength, ROM, flexibility, and posture for 27 minutes including:  Supine AA sh flex 1# dowel (vertical) 10x10 sec  Supine AA sh ER at 0 deg abd 1# dowel 10x10 sec  Supine AA sh ER at 45 deg 1# dowel 10x10 sec  Posterior sh rolls x10  PROM R bicep stretch in supine 3x20 sec  Pulleys for flex and scaption x2 min ea  Seated UT stretch 3x20 sec    May add: seated scap retraction    Antonietta received the following manual therapy techniques: Friction Massage were applied to the: R bicep tendon for 10 minutes, including:  Cross friction to R bicep tendon  GH jt mobs in all planes grd II-III    Antonietta participated in neuromuscular re-education activities to improve: Posture and nerve glides for 2 minutes.  The following activities were included:  NP Ulnar n glides x10 (cues to stay in symptom free ROM)  NP Prone scap depression x10, 5 sec hold  Standing scap depression    Pt rec'd ice pack to R shoulder x10 min post tx for decreased pain and soreness.    Patient Education and Home Exercises     Home Exercises Provided and Patient Education Provided     Education provided:   - perform ex in pain free ROM  Pt gave verbal understanding to all education provided     Written Home Exercises Provided: pt instructed to continue previous HEP. Exercises were reviewed and Antonietta was able to demonstrate them prior to the end of the session.  Antonietta demonstrated good  understanding of the education provided. See EMR under Patient Instructions for exercises provided during therapy sessions    ASSESSMENT     Pt with increased R bicep pain and tension today after digging a hole for a tree yesterday.  She continues to improve with R shoulder ROM.  R shoulder abduction is most limited today.  Attempted scap retraction, but stopped due to increased bicep pain.  Pain resolved with rest and ice.   She will continue to benefit from PT for improved R shoulder ROM and UE strength for improved functional mobility.    Antonietta Is progressing well towards her goals.   Pt prognosis is Good.     Pt will continue to benefit from skilled outpatient physical therapy to address the deficits listed in the problem list box on initial evaluation, provide pt/family education and to maximize pt's level of independence in the home and community environment.     Pt's spiritual, cultural and educational needs considered and pt agreeable to plan of care and goals.     Anticipated barriers to physical therapy: none    Goals:   Short Term Goals:  4 weeks (progressing, not met)  1.Report decreased    R shoulder    pain  <   / =  4  /10 at its worst  to increase tolerance for ADLs  2. Increased R shoulder flexion AROM to at least 170 deg for increased ease with  ADLs.  3. Increased R shoulder abduction AROM to at least 130 deg for increased ease with ADLs.  4.  Increased shoulder ER at 90 deg AROM to at least 70 deg for increased ease with ADLs.  5. Pt to tolerate HEP to improve ROM and independence with ADL's    Long Term Goals: 8 weeks (progressing, not met)  1.Report decreased    R shoulder    pain  <   / =  4  /10 at its worst  to increase tolerance for ADLs  2. Increased R shoulder flexion AROM to at least 175 deg for increased ease with ADLs.  3. Increased R shoulder abduction AROM to at least 150 deg for increased ease with ADLs.  4.  Increased shoulder ER at 90 deg AROM to at least 90 deg for increased ease with ADLs.  5. Pt to tolerate HEP to improve ROM and independence with ADL's  6. Pt will demonstrate periscap strength of at least 4/5 in all planes for increased ease with ADLs.      PLAN     Continue PT towards established goals.  Progress R shoulder ROM and UE/periscap strength and stabilization as tolerated.    Plan of care Certification: 3/21/2023 to 5/19/2023.    Outpatient Physical Therapy 2 times weekly for 8 weeks to include the following interventions: Electrical Stimulation  , Manual Therapy, Moist Heat/ Ice, Neuromuscular Re-ed, Patient Education, Self Care, Therapeutic Activities, Therapeutic Exercise, and dry needling.       Gladis Mina, PT

## 2023-03-30 ENCOUNTER — CLINICAL SUPPORT (OUTPATIENT)
Dept: REHABILITATION | Facility: HOSPITAL | Age: 53
End: 2023-03-30
Payer: COMMERCIAL

## 2023-03-30 DIAGNOSIS — M25.60 RANGE OF MOTION DEFICIT: ICD-10-CM

## 2023-03-30 DIAGNOSIS — R26.89 DECREASED FUNCTIONAL MOBILITY: ICD-10-CM

## 2023-03-30 DIAGNOSIS — M25.511 CHRONIC RIGHT SHOULDER PAIN: Primary | ICD-10-CM

## 2023-03-30 DIAGNOSIS — R29.3 POOR POSTURE: ICD-10-CM

## 2023-03-30 DIAGNOSIS — G89.29 CHRONIC RIGHT SHOULDER PAIN: Primary | ICD-10-CM

## 2023-03-30 PROCEDURE — 97110 THERAPEUTIC EXERCISES: CPT | Mod: PN

## 2023-03-30 PROCEDURE — 97140 MANUAL THERAPY 1/> REGIONS: CPT | Mod: PN

## 2023-03-30 PROCEDURE — 97010 HOT OR COLD PACKS THERAPY: CPT | Mod: PN

## 2023-04-03 ENCOUNTER — CLINICAL SUPPORT (OUTPATIENT)
Dept: REHABILITATION | Facility: HOSPITAL | Age: 53
End: 2023-04-03
Payer: COMMERCIAL

## 2023-04-03 DIAGNOSIS — R29.3 POOR POSTURE: ICD-10-CM

## 2023-04-03 DIAGNOSIS — R26.89 DECREASED FUNCTIONAL MOBILITY: ICD-10-CM

## 2023-04-03 DIAGNOSIS — M25.60 RANGE OF MOTION DEFICIT: ICD-10-CM

## 2023-04-03 DIAGNOSIS — G89.29 CHRONIC RIGHT SHOULDER PAIN: Primary | ICD-10-CM

## 2023-04-03 DIAGNOSIS — M25.511 CHRONIC RIGHT SHOULDER PAIN: Primary | ICD-10-CM

## 2023-04-03 PROCEDURE — 97110 THERAPEUTIC EXERCISES: CPT | Mod: PN

## 2023-04-03 PROCEDURE — 97140 MANUAL THERAPY 1/> REGIONS: CPT | Mod: PN

## 2023-04-03 NOTE — PROGRESS NOTES
[facility]   Physical Therapy Treatment Note     Name: Antonietta Hanson [facility]  Clinic Number: [id number]    Therapy Diagnosis:   Encounter Diagnoses   Name Primary?    Chronic right shoulder pain Yes    Range of motion deficit     Poor posture     Decreased functional mobility        Physician: Leyda Hartmann MD    Visit Date: 4/3/2023    Physician Orders: PT Eval and Treat   Medical Diagnosis from Referral: Adhesive capsulitis of right shoulder  Evaluation Date: 3/21/2023  Authorization Period Expiration: 12/31/23  Plan of Care Expiration: 5/19/23  Visit # / Visits authorized: 3/ 20    Time In: 10:00  Time Out: 10:48  Total Billable Time: 43 minutes    Precautions: Standard    PTA Visit #: 0/5       SUBJECTIVE     Pt reports: she has some aching to R shoulder.    She was compliant with home exercise program.  Response to previous treatment: did well  Functional change: able to reach in back seat without R shoulder pain.    Pain: 3/10  Location: right shoulder     OBJECTIVE       Treatment     Antonietta received the treatments listed below:      therapeutic exercises to develop strength, ROM, flexibility, and posture for 28 minutes including:  Supine AA sh flex 1# dowel (vertical) 10x10 sec  Supine AA sh ER at 45 deg abd 1# dowel 10x10 sec  Supine AA sh ER at 90 deg 1# dowel 10x10 sec  Posterior sh rolls x10  PROM R bicep stretch in supine 3x20 sec  Pulleys for flex and scaption x2 min ea  Seated UT stretch 3x20 sec    May add: seated scap retraction    Antonietta received the following manual therapy techniques: Friction Massage were applied to the: R shoulder for 10 minutes, including:  Cross friction to R bicep tendon  GH jt mobs in all planes grd II-III    Antonietta participated in neuromuscular re-education activities to improve: Posture and nerve glides for 5 minutes. The following activities were included:  NP Ulnar n glides x10 (cues to stay in symptom free ROM)  Prone scap  depression 2x10, 5 sec hold    Patient Education and Home Exercises     Home Exercises Provided and Patient Education Provided     Education provided:   - perform ex in pain free ROM  Pt gave verbal understanding to all education provided     Written Home Exercises Provided: pt instructed to continue previous HEP. Exercises were reviewed and Antonietta was able to demonstrate them prior to the end of the session.  Antonietta demonstrated good  understanding of the education provided. See EMR under Patient Instructions for exercises provided during therapy sessions    ASSESSMENT     Pt is improving with R shoulder ROM.  R shoulder abduction remains most limited, but she demonstrated improved abduction ROM on pulleys today.  Pt able to perform scap depression/retraction in prone, but pain when trying to perform in upright position.   She will continue to benefit from PT for improved R shoulder ROM and UE strength for improved functional mobility.    Antonietta Is progressing well towards her goals.   Pt prognosis is Good.     Pt will continue to benefit from skilled outpatient physical therapy to address the deficits listed in the problem list box on initial evaluation, provide pt/family education and to maximize pt's level of independence in the home and community environment.     Pt's spiritual, cultural and educational needs considered and pt agreeable to plan of care and goals.     Anticipated barriers to physical therapy: none    Goals:   Short Term Goals:  4 weeks (progressing, not met)  1.Report decreased    R shoulder    pain  <   / =  4  /10 at its worst  to increase tolerance for ADLs  2. Increased R shoulder flexion AROM to at least 170 deg for increased ease with ADLs.  3. Increased R shoulder abduction AROM to at least 130 deg for increased ease with ADLs.  4.  Increased shoulder ER at 90 deg AROM to at least 70 deg for increased ease with ADLs.  5. Pt to tolerate HEP to improve ROM and independence with  ADL's    Long Term Goals: 8 weeks (progressing, not met)  1.Report decreased    R shoulder    pain  <   / =  4  /10 at its worst  to increase tolerance for ADLs  2. Increased R shoulder flexion AROM to at least 175 deg for increased ease with ADLs.  3. Increased R shoulder abduction AROM to at least 150 deg for increased ease with ADLs.  4.  Increased shoulder ER at 90 deg AROM to at least 90 deg for increased ease with ADLs.  5. Pt to tolerate HEP to improve ROM and independence with ADL's  6. Pt will demonstrate periscap strength of at least 4/5 in all planes for increased ease with ADLs.      PLAN     Continue PT towards established goals.  Progress R shoulder ROM and UE/periscap strength and stabilization as tolerated.    Plan of care Certification: 3/21/2023 to 5/19/2023.    Outpatient Physical Therapy 2 times weekly for 8 weeks to include the following interventions: Electrical Stimulation  , Manual Therapy, Moist Heat/ Ice, Neuromuscular Re-ed, Patient Education, Self Care, Therapeutic Activities, Therapeutic Exercise, and dry needling.       Gladis Mina, PT

## 2023-04-12 NOTE — PROGRESS NOTES
[facility]   Physical Therapy Treatment Note     Name: Antonietta Hanson [facility]  Clinic Number: [id number]    Therapy Diagnosis:   Encounter Diagnoses   Name Primary?    Chronic right shoulder pain Yes    Range of motion deficit     Poor posture     Decreased functional mobility          Physician: Leyda Hartmann MD    Visit Date: 4/13/2023    Physician Orders: PT Eval and Treat   Medical Diagnosis from Referral: Adhesive capsulitis of right shoulder  Evaluation Date: 3/21/2023  Authorization Period Expiration: 12/31/23  Plan of Care Expiration: 5/19/23  Visit # / Visits authorized: 4/ 20    Time In: 10:10  Time Out: 10:50  Total Billable Time: 40 minutes    Precautions: Standard    PTA Visit #: 0/5       SUBJECTIVE     Pt reports: she was doing better until she woke up on her R side and had pain to R shoulder and tingling.  She states she had pain to R shoulder with reaching to hang up clothes  She was compliant with home exercise program.  Response to previous treatment: did well  Functional change: able to reach in back seat without R shoulder pain.    Pain: 5/10  Location: right shoulder     OBJECTIVE       Treatment     Gayatri received the treatments listed below:      therapeutic exercises to develop strength, ROM, flexibility, and posture for 28 minutes including:  Supine AA sh flex 1# dowel (vertical) 10x10 sec  Supine AA sh ER at 45 deg abd 1# dowel 10x10 sec  Supine AA sh ER at 90 deg 1# dowel 10x10 sec  Posterior sh rolls x10  PROM R bicep stretch in supine 3x20 sec  Pulleys for flex and scaption x2 min ea  Seated UT stretch 3x20 sec  Wall slides x10, 5 sec hold    May add: seated scap retraction    Antonietta received the following manual therapy techniques: Friction Massage were applied to the: R shoulder for 4 minutes, including:  Cross friction to R bicep tendon  NP GH jt mobs in all planes grd II-III    Antonietta participated in neuromuscular re-education activities to  improve: Posture and nerve glides for 8 minutes. The following activities were included:  NP Ulnar n glides x10 (cues to stay in symptom free ROM)  Prone scap depression 2x10, 5 sec hold  SL scap depression PNF  x10, 5 sec hold (vc and mc to activate lower trap)    Patient Education and Home Exercises     Home Exercises Provided and Patient Education Provided     Education provided:   - perform ex in pain free ROM  Pt gave verbal understanding to all education provided     Written Home Exercises Provided: pt instructed to continue previous HEP. Exercises were reviewed and Gayatri was able to demonstrate them prior to the end of the session.  Gayatri demonstrated good  understanding of the education provided. See EMR under Patient Instructions for exercises provided during therapy sessions    ASSESSMENT     Pt continues to improve with R shoulder ROM.  She continues to require cues to avoid upper trap compensation and to perform activation of lower traps.   She will continue to benefit from PT for improved R shoulder ROM and UE strength for improved functional mobility.    Gayatri Is progressing well towards her goals.   Pt prognosis is Good.     Pt will continue to benefit from skilled outpatient physical therapy to address the deficits listed in the problem list box on initial evaluation, provide pt/family education and to maximize pt's level of independence in the home and community environment.     Pt's spiritual, cultural and educational needs considered and pt agreeable to plan of care and goals.     Anticipated barriers to physical therapy: none    Goals:   Short Term Goals:  4 weeks (progressing, not met)  1.Report decreased    R shoulder    pain  <   / =  4  /10 at its worst  to increase tolerance for ADLs  2. Increased R shoulder flexion AROM to at least 170 deg for increased ease with ADLs.  3. Increased R shoulder abduction AROM to at least 130 deg for increased ease with ADLs.  4.  Increased shoulder ER at 90 deg  AROM to at least 70 deg for increased ease with ADLs.  5. Pt to tolerate HEP to improve ROM and independence with ADL's    Long Term Goals: 8 weeks (progressing, not met)  1.Report decreased    R shoulder    pain  <   / =  4  /10 at its worst  to increase tolerance for ADLs  2. Increased R shoulder flexion AROM to at least 175 deg for increased ease with ADLs.  3. Increased R shoulder abduction AROM to at least 150 deg for increased ease with ADLs.  4.  Increased shoulder ER at 90 deg AROM to at least 90 deg for increased ease with ADLs.  5. Pt to tolerate HEP to improve ROM and independence with ADL's  6. Pt will demonstrate periscap strength of at least 4/5 in all planes for increased ease with ADLs.      PLAN     Continue PT towards established goals.  Progress R shoulder ROM and UE/periscap strength and stabilization as tolerated.    Plan of care Certification: 3/21/2023 to 5/19/2023.    Outpatient Physical Therapy 2 times weekly for 8 weeks to include the following interventions: Electrical Stimulation  , Manual Therapy, Moist Heat/ Ice, Neuromuscular Re-ed, Patient Education, Self Care, Therapeutic Activities, Therapeutic Exercise, and dry needling.       Gladis Mina, PT

## 2023-04-13 ENCOUNTER — CLINICAL SUPPORT (OUTPATIENT)
Dept: REHABILITATION | Facility: HOSPITAL | Age: 53
End: 2023-04-13
Payer: COMMERCIAL

## 2023-04-13 DIAGNOSIS — M25.60 RANGE OF MOTION DEFICIT: ICD-10-CM

## 2023-04-13 DIAGNOSIS — R29.3 POOR POSTURE: ICD-10-CM

## 2023-04-13 DIAGNOSIS — R26.89 DECREASED FUNCTIONAL MOBILITY: ICD-10-CM

## 2023-04-13 DIAGNOSIS — M25.511 CHRONIC RIGHT SHOULDER PAIN: Primary | ICD-10-CM

## 2023-04-13 DIAGNOSIS — G89.29 CHRONIC RIGHT SHOULDER PAIN: Primary | ICD-10-CM

## 2023-04-13 PROCEDURE — 97112 NEUROMUSCULAR REEDUCATION: CPT | Mod: PN

## 2023-04-13 PROCEDURE — 97110 THERAPEUTIC EXERCISES: CPT | Mod: PN

## 2023-04-17 ENCOUNTER — CLINICAL SUPPORT (OUTPATIENT)
Dept: REHABILITATION | Facility: HOSPITAL | Age: 53
End: 2023-04-17
Payer: COMMERCIAL

## 2023-04-17 DIAGNOSIS — R29.3 POOR POSTURE: ICD-10-CM

## 2023-04-17 DIAGNOSIS — M25.60 RANGE OF MOTION DEFICIT: ICD-10-CM

## 2023-04-17 DIAGNOSIS — R26.89 DECREASED FUNCTIONAL MOBILITY: ICD-10-CM

## 2023-04-17 DIAGNOSIS — M25.511 CHRONIC RIGHT SHOULDER PAIN: Primary | ICD-10-CM

## 2023-04-17 DIAGNOSIS — G89.29 CHRONIC RIGHT SHOULDER PAIN: Primary | ICD-10-CM

## 2023-04-17 PROCEDURE — 97140 MANUAL THERAPY 1/> REGIONS: CPT | Mod: PN

## 2023-04-17 PROCEDURE — 97110 THERAPEUTIC EXERCISES: CPT | Mod: PN

## 2023-04-17 PROCEDURE — 97112 NEUROMUSCULAR REEDUCATION: CPT | Mod: PN

## 2023-04-17 NOTE — PROGRESS NOTES
[facility]   Physical Therapy Treatment Note     Name: Antonietta Hanson [facility]  Clinic Number: [id number]    Therapy Diagnosis:   Encounter Diagnoses   Name Primary?    Chronic right shoulder pain Yes    Range of motion deficit     Poor posture     Decreased functional mobility        Physician: Leyda Hartmann MD    Visit Date: 4/17/2023    Physician Orders: PT Eval and Treat   Medical Diagnosis from Referral: Adhesive capsulitis of right shoulder  Evaluation Date: 3/21/2023  Authorization Period Expiration: 12/31/23  Plan of Care Expiration: 5/19/23  Visit # / Visits authorized: 5/ 20    Time In: 10:10  Time Out: 10:58  Total Billable Time: 48 minutes    Precautions: Standard    PTA Visit #: 0/5       SUBJECTIVE     Pt reports: she had a busy weekend so she did not have time to do HEP.  She was setting up table and chairs.  She was not compliant with home exercise program.  Response to previous treatment: did well  Functional change: able to reach in back seat without R shoulder pain.    Pain: 4/10  Location: right shoulder     OBJECTIVE       Treatment     Gayatri received the treatments listed below:      therapeutic exercises to develop strength, ROM, flexibility, and posture for 28 minutes including:  Supine AA sh flex 1# dowel (vertical) 10x10 sec  Supine AA sh ER at 45 deg abd 1# dowel 10x10 sec  Supine AA sh ER at 90 deg 1# dowel 10x10 sec  Posterior sh rolls x10  PROM R bicep stretch in supine 3x20 sec  Pulleys for flex and abduction x2 min ea  Seated UT stretch 3x20 sec  Sh flex Wall slides x10, 5 sec hold    May add: seated scap retraction    Antonietta received the following manual therapy techniques: Friction Massage were applied to the: R shoulder for 8 minutes, including:  Cross friction to R bicep tendon  GH jt mobs posterior and inferior grd II-III    Antonietta participated in neuromuscular re-education activities to improve: Posture and nerve glides for 15 minutes. The  following activities were included:  Ulnar n glides x10 (cues to stay in symptom free ROM)  Prone scap depression 1x10, 5 sec hold  Prone lower trap lift off x10  SL scap depression PNF  x10, 5 sec hold (vc and mc to activate lower trap)  SL sh flex x15  SL sh ER 2x10  Sh ext Y tubing 2x10    Patient Education and Home Exercises     Home Exercises Provided and Patient Education Provided     Education provided:   - perform ex in pain free ROM  Pt gave verbal understanding to all education provided     Written Home Exercises Provided: pt instructed to continue previous HEP. Exercises were reviewed and Gayatri was able to demonstrate them prior to the end of the session.  Gayatri demonstrated good  understanding of the education provided. See EMR under Patient Instructions for exercises provided during therapy sessions    ASSESSMENT     Pt continues to improve with R shoulder ROM.  Right shoulder abduction remains most limited.  She was able to tolerate increased periscap challenges today without upper trap compensation.   She will continue to benefit from PT for improved R shoulder ROM and UE strength for improved functional mobility.    Gayatri Is progressing well towards her goals.   Pt prognosis is Good.     Pt will continue to benefit from skilled outpatient physical therapy to address the deficits listed in the problem list box on initial evaluation, provide pt/family education and to maximize pt's level of independence in the home and community environment.     Pt's spiritual, cultural and educational needs considered and pt agreeable to plan of care and goals.     Anticipated barriers to physical therapy: none    Goals:   Short Term Goals:  4 weeks (progressing, not met)  1.Report decreased    R shoulder    pain  <   / =  4  /10 at its worst  to increase tolerance for ADLs  2. Increased R shoulder flexion AROM to at least 170 deg for increased ease with ADLs.  3. Increased R shoulder abduction AROM to at least 130 deg  for increased ease with ADLs.  4.  Increased shoulder ER at 90 deg AROM to at least 70 deg for increased ease with ADLs.  5. Pt to tolerate HEP to improve ROM and independence with ADL's    Long Term Goals: 8 weeks (progressing, not met)  1.Report decreased    R shoulder    pain  <   / =  4  /10 at its worst  to increase tolerance for ADLs  2. Increased R shoulder flexion AROM to at least 175 deg for increased ease with ADLs.  3. Increased R shoulder abduction AROM to at least 150 deg for increased ease with ADLs.  4.  Increased shoulder ER at 90 deg AROM to at least 90 deg for increased ease with ADLs.  5. Pt to tolerate HEP to improve ROM and independence with ADL's  6. Pt will demonstrate periscap strength of at least 4/5 in all planes for increased ease with ADLs.      PLAN     Continue PT towards established goals.  Progress R shoulder ROM and UE/periscap strength and stabilization as tolerated.    Plan of care Certification: 3/21/2023 to 5/19/2023.    Outpatient Physical Therapy 2 times weekly for 8 weeks to include the following interventions: Electrical Stimulation  , Manual Therapy, Moist Heat/ Ice, Neuromuscular Re-ed, Patient Education, Self Care, Therapeutic Activities, Therapeutic Exercise, and dry needling.       Gladis Mina, PT

## 2023-04-19 NOTE — PROGRESS NOTES
[facility]   Physical Therapy Treatment Note     Name: Antonietta Hanson [facility]  Clinic Number: [id number]    Therapy Diagnosis:   Encounter Diagnoses   Name Primary?    Chronic right shoulder pain Yes    Range of motion deficit     Poor posture     Decreased functional mobility          Physician: Leyda Hartmann MD    Visit Date: 4/20/2023    Physician Orders: PT Eval and Treat   Medical Diagnosis from Referral: Adhesive capsulitis of right shoulder  Evaluation Date: 3/21/2023  Authorization Period Expiration: 12/31/23  Plan of Care Expiration: 5/19/23  Visit # / Visits authorized: 6/ 20    Time In: 10:00  Time Out: 10:50  Total Billable Time: 48 minutes    Precautions: Standard    PTA Visit #: 0/5       SUBJECTIVE     Pt reports: she has soreness from doing band exercises.  She was not compliant with home exercise program.  Response to previous treatment: did well  Functional change: able to reach in back seat without R shoulder pain.    Pain: 4/10  Location: right shoulder     OBJECTIVE       Active Range of Motion: (deg)  Shoulder Left Right   Flexion 177 160   Abduction 170 125 pain   ER at 0 65 65   ER at 90 100 100 pain   IR T5 T8     Upper Extremity Strength  (R) UE  (L) UE    Shoulder flexion: 3+/5 pain Shoulder flexion: 5/5   Shoulder Abduction: 3-/5 Shoulder abduction: 4+/5   Shoulder ER at side 4+/5  Shoulder ER 5/5   Shoulder IR 4+/5  Shoulder IR 5/5   Elbow flexion: 5/5  Elbow flexion: 5/5   Elbow extension: 5/5 Elbow extension: 5/5   Lower Trap 3-/5 Lower Trap 3/5   Middle Trap 3/5 pain Middle Trap 3+/5   Rhomboids 4/5 Rhomboids 4+/5      Joint Mobility: hypomobile R GH jt posterior    Palpation: TTP and tightness to R bicep tendon (long and short head)    Sensation: grossly intact to light touch    Scapular Control/Dyskinesis:    Normal / Subtle / Obvious  Comments    Left  normal      Right  subtle       CMS Impairment/Limitation/Restriction for FOTO Shoulder  Survey  Status Limitation G-Code CMS Severity Modifier  Intake 60% 40%  Predicted 69% 31% Goal Status+ CJ - At least 20 percent but less than 40 percent  4/20/2023 51% 49% Current Status CK - At least 40 percent but less than 60 percent    Treatment     Gayatri received the treatments listed below:      therapeutic exercises to develop strength, ROM, flexibility, and posture for 20 minutes including:  Reassessment  Sh abduction wall slides x10, 5 sec hold    Review HEP and importance of feeling lower trap working to avoid sh hike      Not performed due to time:  Supine AA sh flex 1# dowel (vertical) 10x10 sec  Supine AA sh ER at 45 deg abd 1# dowel 10x10 sec  Supine AA sh ER at 90 deg 1# dowel 10x10 sec  Posterior sh rolls x10  PROM R bicep stretch in supine 3x20 sec  Pulleys for flex and abduction x2 min ea  Seated UT stretch 3x20 sec  Sh flex Wall slides x10, 5 sec hold    May add: seated scap retraction    Not performed today: Antonietta received the following manual therapy techniques: Friction Massage were applied to the: R shoulder for 00 minutes, including:  Cross friction to R bicep tendon  GH jt mobs posterior and inferior grd II-III    Antonietta participated in neuromuscular re-education activities to improve: Posture and nerve glides for 25 minutes. The following activities were included:  Ulnar n glides x10 (cues to stay in symptom free ROM)  Prone lower trap lift off x20  Prone sh ext off EOM 1x15, 1x15 with 1# ea  Prone rows off EOM 2x10 (vc and mc for scap retraction and to avoid sh hike) ea  SL scap depression PNF  x10, 5 sec hold (vc and mc to activate lower trap)  SL sh flex 2x15 (add 1# next visit) ea  SL sh ER 2x10 ea  Sh ext Y tubing 2x10    Patient Education and Home Exercises     Home Exercises Provided and Patient Education Provided     Education provided:   - perform ex in pain free ROM  Pt gave verbal understanding to all education provided     Written Home Exercises Provided: pt instructed to  continue previous HEP. Exercises were reviewed and Gayatri was able to demonstrate them prior to the end of the session.  Gayatri demonstrated good  understanding of the education provided. See EMR under Patient Instructions for exercises provided during therapy sessions    ASSESSMENT     Pt reassessed today.  She has improved with increased R shoulder AROM.  R shoulder abduction ROM remains most limited.  She continues to have weakness to periscap muscles.  She required constant verbal and manual cues for proper scapula position for prone shoulder extension and prone rows.  She will continue to benefit from PT for improved R shoulder ROM and UE strength for improved functional mobility.    Gayatri Is progressing well towards her goals.   Pt prognosis is Good.     Pt will continue to benefit from skilled outpatient physical therapy to address the deficits listed in the problem list box on initial evaluation, provide pt/family education and to maximize pt's level of independence in the home and community environment.     Pt's spiritual, cultural and educational needs considered and pt agreeable to plan of care and goals.     Anticipated barriers to physical therapy: none    Goals:   Short Term Goals:  4 weeks (progressing, not met)  1.Report decreased    R shoulder    pain  <   / =  4  /10 at its worst  to increase tolerance for ADLs (met)  2. Increased R shoulder flexion AROM to at least 170 deg for increased ease with ADLs.(Progressing, not met)  3. Increased R shoulder abduction AROM to at least 130 deg for increased ease with ADLs. (Progressing, not met --125 deg)  4.  Increased shoulder ER at 90 deg AROM to at least 70 deg for increased ease with ADLs. (Met)  5. Pt to tolerate HEP to improve ROM and independence with ADL's (met)    Long Term Goals: 8 weeks (progressing, not met)  1.Report decreased    R shoulder    pain  <   / =  4  /10 at its worst  to increase tolerance for ADLs  2. Increased R shoulder flexion AROM to  at least 175 deg for increased ease with ADLs.  3. Increased R shoulder abduction AROM to at least 150 deg for increased ease with ADLs.  4.  Increased shoulder ER at 90 deg AROM to at least 90 deg for increased ease with ADLs.  5. Pt to tolerate HEP to improve ROM and independence with ADL's  6. Pt will demonstrate periscap strength of at least 4/5 in all planes for increased ease with ADLs.    Unmet goals remain appropriate within 4 weeks.    PLAN     Continue PT towards established goals.  Progress R shoulder ROM and UE/periscap strength and stabilization as tolerated.    Plan of care Certification: 3/21/2023 to 5/19/2023.    Outpatient Physical Therapy 2 times weekly for 8 weeks to include the following interventions: Electrical Stimulation  , Manual Therapy, Moist Heat/ Ice, Neuromuscular Re-ed, Patient Education, Self Care, Therapeutic Activities, Therapeutic Exercise, and dry needling.       Gladis Mina, PT

## 2023-04-20 ENCOUNTER — CLINICAL SUPPORT (OUTPATIENT)
Dept: REHABILITATION | Facility: HOSPITAL | Age: 53
End: 2023-04-20
Payer: COMMERCIAL

## 2023-04-20 DIAGNOSIS — R26.89 DECREASED FUNCTIONAL MOBILITY: ICD-10-CM

## 2023-04-20 DIAGNOSIS — M25.60 RANGE OF MOTION DEFICIT: ICD-10-CM

## 2023-04-20 DIAGNOSIS — M25.511 CHRONIC RIGHT SHOULDER PAIN: Primary | ICD-10-CM

## 2023-04-20 DIAGNOSIS — G89.29 CHRONIC RIGHT SHOULDER PAIN: Primary | ICD-10-CM

## 2023-04-20 DIAGNOSIS — R29.3 POOR POSTURE: ICD-10-CM

## 2023-04-20 PROCEDURE — 97110 THERAPEUTIC EXERCISES: CPT | Mod: PN

## 2023-04-20 PROCEDURE — 97112 NEUROMUSCULAR REEDUCATION: CPT | Mod: PN

## 2023-04-24 ENCOUNTER — CLINICAL SUPPORT (OUTPATIENT)
Dept: REHABILITATION | Facility: HOSPITAL | Age: 53
End: 2023-04-24
Payer: COMMERCIAL

## 2023-04-24 DIAGNOSIS — R29.3 POOR POSTURE: ICD-10-CM

## 2023-04-24 DIAGNOSIS — M25.511 CHRONIC RIGHT SHOULDER PAIN: Primary | ICD-10-CM

## 2023-04-24 DIAGNOSIS — R26.89 DECREASED FUNCTIONAL MOBILITY: ICD-10-CM

## 2023-04-24 DIAGNOSIS — G89.29 CHRONIC RIGHT SHOULDER PAIN: Primary | ICD-10-CM

## 2023-04-24 DIAGNOSIS — M25.60 RANGE OF MOTION DEFICIT: ICD-10-CM

## 2023-04-24 PROCEDURE — 97112 NEUROMUSCULAR REEDUCATION: CPT | Mod: PN

## 2023-04-24 PROCEDURE — 97110 THERAPEUTIC EXERCISES: CPT | Mod: PN

## 2023-04-24 NOTE — PROGRESS NOTES
[facility]   Physical Therapy Treatment Note     Name: Antonietta Hanson [facility]  Clinic Number: [id number]    Therapy Diagnosis:   Encounter Diagnoses   Name Primary?    Chronic right shoulder pain Yes    Range of motion deficit     Poor posture     Decreased functional mobility            Physician: Leyda Hartmann MD    Visit Date: 4/24/2023    Physician Orders: PT Eval and Treat   Medical Diagnosis from Referral: Adhesive capsulitis of right shoulder  Evaluation Date: 3/21/2023  Authorization Period Expiration: 12/31/23  Plan of Care Expiration: 5/19/23  Visit # / Visits authorized: 7/ 20    Time In: 10:00  Time Out: 10:46  Total Billable Time: 46 minutes    Precautions: Standard    PTA Visit #: 0/5       SUBJECTIVE     Pt reports: she was able to  pan with R hand without pain.  She was not compliant with home exercise program.  Response to previous treatment: did well  Functional change: able to reach in back seat without R shoulder pain.    Pain: 4/10  Location: right shoulder     OBJECTIVE       Treatment     Gayatri received the treatments listed below:      Antoniteta participated in neuromuscular re-education activities to improve: Posture and nerve glides for 38 minutes. The following activities were included:  NP Ulnar n glides x10 (cues to stay in symptom free ROM)  Prone lower trap lift off x20  Prone sh ext off EOM 2x15 with 1# ea  Prone rows off EOM 2x10 (vc and mc for scap retraction and to avoid sh hike) ea  SL scap depression PNF  x10, 5 sec hold (vc and mc to activate lower trap)  SL sh flex 2x15 (add 1# next visit) ea  SL sh ER 2x15 ea, 1#  SL sh abd 2x10 ea  Sh ext Y tubing 2x10    therapeutic exercises to develop strength, ROM, flexibility, and posture for 8 minutes including:  PROM R shoulder in all planes  NP Sh abduction wall slides x10, 5 sec hold    Review HEP and importance of feeling lower trap working to avoid sh hike      Not performed due to  time:  Supine AA sh flex 1# dowel (vertical) 10x10 sec  Supine AA sh ER at 45 deg abd 1# dowel 10x10 sec  Supine AA sh ER at 90 deg 1# dowel 10x10 sec  Posterior sh rolls x10  PROM R bicep stretch in supine 3x20 sec  Pulleys for flex and abduction x2 min ea  Seated UT stretch 3x20 sec  Sh flex Wall slides x10, 5 sec hold    May add: seated scap retraction    Not performed today: Antonietta received the following manual therapy techniques: Friction Massage were applied to the: R shoulder for 00 minutes, including:  Cross friction to R bicep tendon  GH jt mobs posterior and inferior grd II-III      Patient Education and Home Exercises     Home Exercises Provided and Patient Education Provided     Education provided:   - perform ex in pain free ROM  Pt gave verbal understanding to all education provided     Written Home Exercises Provided: pt instructed to continue previous HEP. Exercises were reviewed and Gayatri was able to demonstrate them prior to the end of the session.  Gayatri demonstrated good  understanding of the education provided. See EMR under Patient Instructions for exercises provided during therapy sessions    ASSESSMENT     Pt demonstrated improved postural awareness.  She was able to activate lower trap with increased ease compared to last visit.  She continue to require constant manual cues during rows for rhomboid activation and to avoid upper trap compensation.  She will continue to benefit from PT for improved R shoulder ROM and UE strength for improved functional mobility.    Gayatri Is progressing well towards her goals.   Pt prognosis is Good.     Pt will continue to benefit from skilled outpatient physical therapy to address the deficits listed in the problem list box on initial evaluation, provide pt/family education and to maximize pt's level of independence in the home and community environment.     Pt's spiritual, cultural and educational needs considered and pt agreeable to plan of care and goals.      Anticipated barriers to physical therapy: none    Goals:   Short Term Goals:  4 weeks (progressing, not met)  1.Report decreased    R shoulder    pain  <   / =  4  /10 at its worst  to increase tolerance for ADLs (met)  2. Increased R shoulder flexion AROM to at least 170 deg for increased ease with ADLs.(Progressing, not met)  3. Increased R shoulder abduction AROM to at least 130 deg for increased ease with ADLs. (Progressing, not met --125 deg)  4.  Increased shoulder ER at 90 deg AROM to at least 70 deg for increased ease with ADLs. (Met)  5. Pt to tolerate HEP to improve ROM and independence with ADL's (met)    Long Term Goals: 8 weeks (progressing, not met)  1.Report decreased    R shoulder    pain  <   / =  4  /10 at its worst  to increase tolerance for ADLs  2. Increased R shoulder flexion AROM to at least 175 deg for increased ease with ADLs.  3. Increased R shoulder abduction AROM to at least 150 deg for increased ease with ADLs.  4.  Increased shoulder ER at 90 deg AROM to at least 90 deg for increased ease with ADLs.  5. Pt to tolerate HEP to improve ROM and independence with ADL's  6. Pt will demonstrate periscap strength of at least 4/5 in all planes for increased ease with ADLs.    Unmet goals remain appropriate within 4 weeks.    PLAN     Continue PT towards established goals.  Progress R shoulder ROM and UE/periscap strength and stabilization as tolerated.    Plan of care Certification: 3/21/2023 to 5/19/2023.    Outpatient Physical Therapy 2 times weekly for 8 weeks to include the following interventions: Electrical Stimulation  , Manual Therapy, Moist Heat/ Ice, Neuromuscular Re-ed, Patient Education, Self Care, Therapeutic Activities, Therapeutic Exercise, and dry needling.       Gladis Mina, PT

## 2023-04-26 NOTE — PROGRESS NOTES
[facility]   Physical Therapy Treatment Note     Name: Antonietta Hanson [facility]  Clinic Number: [id number]    Therapy Diagnosis:   Encounter Diagnoses   Name Primary?    Chronic right shoulder pain Yes    Range of motion deficit     Poor posture     Decreased functional mobility        Physician: Leyda Hartmann MD    Visit Date: 4/27/2023    Physician Orders: PT Eval and Treat   Medical Diagnosis from Referral: Adhesive capsulitis of right shoulder  Evaluation Date: 3/21/2023  Authorization Period Expiration: 12/31/23  Plan of Care Expiration: 5/19/23  Visit # / Visits authorized: 8/ 20    Time In: 10:00  Time Out: 10:30 (pt needed to leave early)  Total Billable Time: 30 minutes    Precautions: Standard    PTA Visit #: 0/5       SUBJECTIVE     Pt reports: she was able to  pan with R hand without pain.  She was not compliant with home exercise program.  Response to previous treatment: did well  Functional change: able to reach in back seat without R shoulder pain.    Pain: 3/10  Location: right shoulder     OBJECTIVE       Treatment     Gayatri received the treatments listed below:      Antonietta participated in neuromuscular re-education activities to improve: Posture and nerve glides for 30 minutes. The following activities were included:  NP Ulnar n glides x10 (cues to stay in symptom free ROM)  Prone lower trap lift off x20  Prone sh ext off EOM x20 with 1# ea  Prone rows off EOM x20 (vc and mc for scap retraction and to avoid sh hike) ea  SL scap depression PNF  x10, 5 sec hold (vc and mc to activate lower trap)  SL sh flex 2x15 ea, 1#  SL sh ER 2x15 ea, 1#  SL sh abd 2x15 ea  Sh ext Y tubing x15  Rows Y tubing x15    May add: scap clocks    Not performed today due to time: therapeutic exercises to develop strength, ROM, flexibility, and posture for 00 minutes including:  PROM R shoulder in all planes  NP Sh abduction wall slides x10, 5 sec hold    Not performed today: Antonietta  received the following manual therapy techniques: Friction Massage were applied to the: R shoulder for 00 minutes, including:  Cross friction to R bicep tendon  GH jt mobs posterior and inferior grd II-III      Patient Education and Home Exercises     Home Exercises Provided and Patient Education Provided     Education provided:   - perform ex in pain free ROM  Pt gave verbal understanding to all education provided     Written Home Exercises Provided: pt instructed to continue previous HEP. Exercises were reviewed and Gayatri was able to demonstrate them prior to the end of the session.  Gayatri demonstrated good  understanding of the education provided. See EMR under Patient Instructions for exercises provided during therapy sessions    ASSESSMENT     Pt continues to improve with postural and periscap awareness.  She required decreased overall cues for periscap strengthening today.  She was able to tolerate addition of standing rows and demonstrated good form without upper trap compensation.  Shortened session today due to pt needing to leave early. She will continue to benefit from PT for improved R shoulder ROM and UE strength for improved functional mobility.    Gayatri Is progressing well towards her goals.   Pt prognosis is Good.     Pt will continue to benefit from skilled outpatient physical therapy to address the deficits listed in the problem list box on initial evaluation, provide pt/family education and to maximize pt's level of independence in the home and community environment.     Pt's spiritual, cultural and educational needs considered and pt agreeable to plan of care and goals.     Anticipated barriers to physical therapy: none    Goals:   Short Term Goals:  4 weeks (progressing, not met)  1.Report decreased    R shoulder    pain  <   / =  4  /10 at its worst  to increase tolerance for ADLs (met)  2. Increased R shoulder flexion AROM to at least 170 deg for increased ease with ADLs.(Progressing, not  met)  3. Increased R shoulder abduction AROM to at least 130 deg for increased ease with ADLs. (Progressing, not met --125 deg)  4.  Increased shoulder ER at 90 deg AROM to at least 70 deg for increased ease with ADLs. (Met)  5. Pt to tolerate HEP to improve ROM and independence with ADL's (met)    Long Term Goals: 8 weeks (progressing, not met)  1.Report decreased    R shoulder    pain  <   / =  4  /10 at its worst  to increase tolerance for ADLs  2. Increased R shoulder flexion AROM to at least 175 deg for increased ease with ADLs.  3. Increased R shoulder abduction AROM to at least 150 deg for increased ease with ADLs.  4.  Increased shoulder ER at 90 deg AROM to at least 90 deg for increased ease with ADLs.  5. Pt to tolerate HEP to improve ROM and independence with ADL's  6. Pt will demonstrate periscap strength of at least 4/5 in all planes for increased ease with ADLs.    Unmet goals remain appropriate within 4 weeks.    PLAN     Continue PT towards established goals.  Progress R shoulder ROM and UE/periscap strength and stabilization as tolerated.    Plan of care Certification: 3/21/2023 to 5/19/2023.    Outpatient Physical Therapy 2 times weekly for 8 weeks to include the following interventions: Electrical Stimulation  , Manual Therapy, Moist Heat/ Ice, Neuromuscular Re-ed, Patient Education, Self Care, Therapeutic Activities, Therapeutic Exercise, and dry needling.       Gladis Mina, PT

## 2023-04-27 ENCOUNTER — CLINICAL SUPPORT (OUTPATIENT)
Dept: REHABILITATION | Facility: HOSPITAL | Age: 53
End: 2023-04-27
Payer: COMMERCIAL

## 2023-04-27 DIAGNOSIS — R26.89 DECREASED FUNCTIONAL MOBILITY: ICD-10-CM

## 2023-04-27 DIAGNOSIS — G89.29 CHRONIC RIGHT SHOULDER PAIN: Primary | ICD-10-CM

## 2023-04-27 DIAGNOSIS — R29.3 POOR POSTURE: ICD-10-CM

## 2023-04-27 DIAGNOSIS — M25.60 RANGE OF MOTION DEFICIT: ICD-10-CM

## 2023-04-27 DIAGNOSIS — M25.511 CHRONIC RIGHT SHOULDER PAIN: Primary | ICD-10-CM

## 2023-04-27 PROCEDURE — 97110 THERAPEUTIC EXERCISES: CPT | Mod: PN

## 2023-05-03 ENCOUNTER — CLINICAL SUPPORT (OUTPATIENT)
Dept: REHABILITATION | Facility: HOSPITAL | Age: 53
End: 2023-05-03
Payer: COMMERCIAL

## 2023-05-03 DIAGNOSIS — R29.3 POOR POSTURE: ICD-10-CM

## 2023-05-03 DIAGNOSIS — R26.89 DECREASED FUNCTIONAL MOBILITY: ICD-10-CM

## 2023-05-03 DIAGNOSIS — M25.511 CHRONIC RIGHT SHOULDER PAIN: Primary | ICD-10-CM

## 2023-05-03 DIAGNOSIS — G89.29 CHRONIC RIGHT SHOULDER PAIN: Primary | ICD-10-CM

## 2023-05-03 DIAGNOSIS — M25.60 RANGE OF MOTION DEFICIT: ICD-10-CM

## 2023-05-03 PROCEDURE — 97112 NEUROMUSCULAR REEDUCATION: CPT | Mod: PN

## 2023-05-09 NOTE — PROGRESS NOTES
[facility]   Physical Therapy Treatment Note     Name: Antonietta Hanson [facility]  Clinic Number: [id number]    Therapy Diagnosis:   Encounter Diagnoses   Name Primary?    Chronic right shoulder pain Yes    Range of motion deficit     Poor posture     Decreased functional mobility        Physician: Leyda Hartmann MD    Visit Date: 5/10/2023    Physician Orders: PT Eval and Treat   Medical Diagnosis from Referral: Adhesive capsulitis of right shoulder  Evaluation Date: 3/21/2023  Authorization Period Expiration: 12/31/23  Plan of Care Expiration: 5/19/23  Visit # / Visits authorized: 10/ 20    Time In: 10:14  Time Out: 10:45   Total Billable Time: 30 minutes    Precautions: Standard    PTA Visit #: 0/5       SUBJECTIVE     Pt reports: her shoulder is doing ok today.  She was not compliant with home exercise program.  Response to previous treatment: did well  Functional change: able to reach in back seat without R shoulder pain, able to  pan with R and, able to grab purse from back seat.    Pain: 2/10  Location: right shoulder     OBJECTIVE       Treatment     Gayatri received the treatments listed below:      Antonietta participated in neuromuscular re-education activities to improve: Posture and nerve glides for 15 minutes. The following activities were included:  NP Ulnar n glides x10 (cues to stay in symptom free ROM)  NP SL sh flex 2x20 ea, 1#  SL horiz abd x20 ea  Scap clocks Y band x5 ea  Sh flex wall slides on ball x15  Lower trap lift offs in corner x10 (vc and mc for scap depression)  Sh ext + ER Y tubing x15  Rows B tubing x15    Therapeutic exercise x10 min to develop strength, ROM, flexibility, and posture to include:  Supine scap protraction 2x10, 2#  Prone sh ext off EOM x20 with 2# ea  Prone rows off EOM x20, 2# ea  SL sh ER 1x20 ea, 2#  SL sh abd 2x15 ea, 1#    May add:  prone W    Antonietta received the following manual therapy techniques: Friction Massage were applied to  the: R shoulder for 5 minutes, including:  GH jt mobs inferior grd II-III      Patient Education and Home Exercises     Home Exercises Provided and Patient Education Provided     Education provided:   - perform ex in pain free ROM  Pt gave verbal understanding to all education provided     Written Home Exercises Provided: yes and given YTB. Exercises were reviewed and Gayatri was able to demonstrate them prior to the end of the session.  Gayatri demonstrated good  understanding of the education provided. See EMR under Patient Instructions for exercises provided during therapy sessions    ASSESSMENT     Pt able to perform prone periscap strengthening without cues today.  Scap clocks was challenging, but pt able to perform with good form.  Pt required constant cues for scap depression for lower trap lift off due to decreased lower trap strength.  She will continue to benefit from PT for improved R shoulder ROM and UE strength for improved functional mobility.    Gayatri Is progressing well towards her goals.   Pt prognosis is Good.     Pt will continue to benefit from skilled outpatient physical therapy to address the deficits listed in the problem list box on initial evaluation, provide pt/family education and to maximize pt's level of independence in the home and community environment.     Pt's spiritual, cultural and educational needs considered and pt agreeable to plan of care and goals.     Anticipated barriers to physical therapy: none    Goals:   Short Term Goals:  4 weeks (progressing, not met)  1.Report decreased    R shoulder    pain  <   / =  4  /10 at its worst  to increase tolerance for ADLs (met)  2. Increased R shoulder flexion AROM to at least 170 deg for increased ease with ADLs.(Progressing, not met)  3. Increased R shoulder abduction AROM to at least 130 deg for increased ease with ADLs. (Progressing, not met --125 deg)  4.  Increased shoulder ER at 90 deg AROM to at least 70 deg for increased ease with  ADLs. (Met)  5. Pt to tolerate HEP to improve ROM and independence with ADL's (met)    Long Term Goals: 8 weeks (progressing, not met)  1.Report decreased    R shoulder    pain  <   / =  4  /10 at its worst  to increase tolerance for ADLs  2. Increased R shoulder flexion AROM to at least 175 deg for increased ease with ADLs.  3. Increased R shoulder abduction AROM to at least 150 deg for increased ease with ADLs.  4.  Increased shoulder ER at 90 deg AROM to at least 90 deg for increased ease with ADLs.  5. Pt to tolerate HEP to improve ROM and independence with ADL's  6. Pt will demonstrate periscap strength of at least 4/5 in all planes for increased ease with ADLs.    PLAN     Continue PT towards established goals.  Progress R shoulder ROM and UE/periscap strength and stabilization as tolerated.    Plan of care Certification: 3/21/2023 to 5/19/2023.    Outpatient Physical Therapy 2 times weekly for 8 weeks to include the following interventions: Electrical Stimulation  , Manual Therapy, Moist Heat/ Ice, Neuromuscular Re-ed, Patient Education, Self Care, Therapeutic Activities, Therapeutic Exercise, and dry needling.       Gladis Mina, PT

## 2023-05-10 ENCOUNTER — CLINICAL SUPPORT (OUTPATIENT)
Dept: REHABILITATION | Facility: HOSPITAL | Age: 53
End: 2023-05-10
Payer: COMMERCIAL

## 2023-05-10 DIAGNOSIS — M25.60 RANGE OF MOTION DEFICIT: ICD-10-CM

## 2023-05-10 DIAGNOSIS — R29.3 POOR POSTURE: ICD-10-CM

## 2023-05-10 DIAGNOSIS — G89.29 CHRONIC RIGHT SHOULDER PAIN: Primary | ICD-10-CM

## 2023-05-10 DIAGNOSIS — M25.511 CHRONIC RIGHT SHOULDER PAIN: Primary | ICD-10-CM

## 2023-05-10 DIAGNOSIS — R26.89 DECREASED FUNCTIONAL MOBILITY: ICD-10-CM

## 2023-05-10 PROCEDURE — 97110 THERAPEUTIC EXERCISES: CPT | Mod: PN

## 2023-05-10 PROCEDURE — 97112 NEUROMUSCULAR REEDUCATION: CPT | Mod: PN

## 2023-08-15 NOTE — PROGRESS NOTES
[facility]   Physical Therapy Treatment Note     Name: Antonietta Hanson [facility]  Clinic Number: [id number]    Therapy Diagnosis:   Encounter Diagnoses   Name Primary?    Chronic right shoulder pain Yes    Range of motion deficit     Poor posture     Decreased functional mobility          Physician: Leyda Hartmann MD    Visit Date: 5/3/2023    Physician Orders: PT Eval and Treat   Medical Diagnosis from Referral: Adhesive capsulitis of right shoulder  Evaluation Date: 3/21/2023  Authorization Period Expiration: 12/31/23  Plan of Care Expiration: 5/19/23  Visit # / Visits authorized: 9/ 20    Time In: 10:05  Time Out: 10:45   Total Billable Time: 40 minutes    Precautions: Standard    PTA Visit #: 0/5       SUBJECTIVE     Pt reports: her shoulder is doing ok today.  She was not compliant with home exercise program.  Response to previous treatment: did well  Functional change: able to reach in back seat without R shoulder pain, able to  pan with R and, able to grab purse from back seat.    Pain: 4/10  Location: right shoulder     OBJECTIVE       Treatment     Gayatri received the treatments listed below:      Antonietta participated in neuromuscular re-education activities to improve: Posture and nerve glides for 40 minutes. The following activities were included:  NP Ulnar n glides x10 (cues to stay in symptom free ROM)  Supine scap protraction 2x10  Prone lower trap lift off with shoulder ER 2x10  Prone sh ext off EOM x20 with 1# ea  Prone rows off EOM x20, 1# ea  SL scap depression PNF  x10, 5 sec hold (vc and mc to activate lower trap)  SL sh flex 2x20 ea, 1#  SL sh ER 2x20 ea, 1#  SL sh abd 2x15 ea, 1#  SL horiz abd 2x10 ea  Sh ext + ER Y tubing x15  Rows Y tubing x15    May add: scap clocks, prone W    Not performed today due to time: therapeutic exercises to develop strength, ROM, flexibility, and posture for 00 minutes including:  PROM R shoulder in all planes  NP Sh abduction  wall slides x10, 5 sec hold    Not performed today: Antonietta received the following manual therapy techniques: Friction Massage were applied to the: R shoulder for 00 minutes, including:  Cross friction to R bicep tendon  GH jt mobs posterior and inferior grd II-III      Patient Education and Home Exercises     Home Exercises Provided and Patient Education Provided     Education provided:   - perform ex in pain free ROM  Pt gave verbal understanding to all education provided     Written Home Exercises Provided: pt instructed to continue previous HEP. Exercises were reviewed and Gayatri was able to demonstrate them prior to the end of the session.  Gayatri demonstrated good  understanding of the education provided. See EMR under Patient Instructions for exercises provided during therapy sessions    ASSESSMENT     Pt demonstrated significant improvement in postural awareness and scapula positioning.  She was able to tolerate increased periscap challenges without pain.  She will continue to benefit from PT for improved R shoulder ROM and UE strength for improved functional mobility.    Gayatri Is progressing well towards her goals.   Pt prognosis is Good.     Pt will continue to benefit from skilled outpatient physical therapy to address the deficits listed in the problem list box on initial evaluation, provide pt/family education and to maximize pt's level of independence in the home and community environment.     Pt's spiritual, cultural and educational needs considered and pt agreeable to plan of care and goals.     Anticipated barriers to physical therapy: none    Goals:   Short Term Goals:  4 weeks (progressing, not met)  1.Report decreased    R shoulder    pain  <   / =  4  /10 at its worst  to increase tolerance for ADLs (met)  2. Increased R shoulder flexion AROM to at least 170 deg for increased ease with ADLs.(Progressing, not met)  3. Increased R shoulder abduction AROM to at least 130 deg for increased ease with  ADLs. (Progressing, not met --125 deg)  4.  Increased shoulder ER at 90 deg AROM to at least 70 deg for increased ease with ADLs. (Met)  5. Pt to tolerate HEP to improve ROM and independence with ADL's (met)    Long Term Goals: 8 weeks (progressing, not met)  1.Report decreased    R shoulder    pain  <   / =  4  /10 at its worst  to increase tolerance for ADLs  2. Increased R shoulder flexion AROM to at least 175 deg for increased ease with ADLs.  3. Increased R shoulder abduction AROM to at least 150 deg for increased ease with ADLs.  4.  Increased shoulder ER at 90 deg AROM to at least 90 deg for increased ease with ADLs.  5. Pt to tolerate HEP to improve ROM and independence with ADL's  6. Pt will demonstrate periscap strength of at least 4/5 in all planes for increased ease with ADLs.    Unmet goals remain appropriate within 4 weeks.    PLAN     Continue PT towards established goals.  Progress R shoulder ROM and UE/periscap strength and stabilization as tolerated.    Plan of care Certification: 3/21/2023 to 5/19/2023.    Outpatient Physical Therapy 2 times weekly for 8 weeks to include the following interventions: Electrical Stimulation  , Manual Therapy, Moist Heat/ Ice, Neuromuscular Re-ed, Patient Education, Self Care, Therapeutic Activities, Therapeutic Exercise, and dry needling.       Gladis Mina, PT                         declines

## 2023-09-01 ENCOUNTER — HOSPITAL ENCOUNTER (OUTPATIENT)
Dept: RADIOLOGY | Facility: HOSPITAL | Age: 53
Discharge: HOME | End: 2023-09-01
Attending: STUDENT IN AN ORGANIZED HEALTH CARE EDUCATION/TRAINING PROGRAM
Payer: COMMERCIAL

## 2023-09-01 DIAGNOSIS — Z12.31 OTHER SCREENING MAMMOGRAM: ICD-10-CM

## 2023-09-01 PROCEDURE — 77063 MAMMO DIGITAL SCREENING BILAT WITH TOMO: ICD-10-PCS | Mod: 26,,, | Performed by: RADIOLOGY

## 2023-09-01 PROCEDURE — 77067 SCR MAMMO BI INCL CAD: CPT | Mod: TC,PN

## 2023-09-01 PROCEDURE — 77067 SCR MAMMO BI INCL CAD: CPT | Mod: 26,,, | Performed by: RADIOLOGY

## 2023-09-01 PROCEDURE — 77067 MAMMO DIGITAL SCREENING BILAT WITH TOMO: ICD-10-PCS | Mod: 26,,, | Performed by: RADIOLOGY

## 2023-09-01 PROCEDURE — 77063 BREAST TOMOSYNTHESIS BI: CPT | Mod: 26,,, | Performed by: RADIOLOGY

## 2023-09-11 ENCOUNTER — DOCUMENTATION ONLY (OUTPATIENT)
Dept: REHABILITATION | Facility: HOSPITAL | Age: 53
End: 2023-09-11
Payer: COMMERCIAL

## 2023-09-11 NOTE — PROGRESS NOTES
[facility]  Physical Therapy Discharge Note    Name: Antonietta Hanson [facility]  Clinic Number: [id number]    Therapy Diagnosis:        Encounter Diagnoses   Name Primary?    Chronic right shoulder pain Yes    Range of motion deficit      Poor posture      Decreased functional mobility           Physician: Leyda Hartmann MD     Visit Date: 5/10/2023     Physician Orders: PT Eval and Treat   Medical Diagnosis from Referral: Adhesive capsulitis of right shoulder  Evaluation Date: 3/21/2023      Date of Last visit: 5/10/23  Total Visits Received: 10    ASSESSMENT      Pt cancelled last appointment and did not reschedule    Discharge reason: Other:  self discharge    Discharge FOTO Score: NA    Goals:   Short Term Goals:  4 weeks (progressing, not met)  1.Report decreased    R shoulder    pain  <   / =  4  /10 at its worst  to increase tolerance for ADLs (met)  2. Increased R shoulder flexion AROM to at least 170 deg for increased ease with ADLs.(Progressing, not met)  3. Increased R shoulder abduction AROM to at least 130 deg for increased ease with ADLs. (Progressing, not met --125 deg)  4.  Increased shoulder ER at 90 deg AROM to at least 70 deg for increased ease with ADLs. (Met)  5. Pt to tolerate HEP to improve ROM and independence with ADL's (met)     Long Term Goals: 8 weeks (progressing, not met)  1.Report decreased    R shoulder    pain  <   / =  4  /10 at its worst  to increase tolerance for ADLs  2. Increased R shoulder flexion AROM to at least 175 deg for increased ease with ADLs.  3. Increased R shoulder abduction AROM to at least 150 deg for increased ease with ADLs.  4.  Increased shoulder ER at 90 deg AROM to at least 90 deg for increased ease with ADLs.  5. Pt to tolerate HEP to improve ROM and independence with ADL's  6. Pt will demonstrate periscap strength of at least 4/5 in all planes for increased ease with ADLs.    PLAN   This patient is discharged from Physical  Therapy      Gladis Mina, PT

## 2024-08-20 ENCOUNTER — PATIENT MESSAGE (OUTPATIENT)
Dept: FAMILY MEDICINE | Facility: CLINIC | Age: 54
End: 2024-08-20
Payer: COMMERCIAL

## 2024-09-18 ENCOUNTER — TELEPHONE (OUTPATIENT)
Dept: ENDOCRINOLOGY | Facility: CLINIC | Age: 54
End: 2024-09-18
Payer: COMMERCIAL

## 2024-09-18 NOTE — TELEPHONE ENCOUNTER
----- Message from Claire Leonard sent at 9/18/2024  9:31 AM CDT -----  Regarding: appointment  Contact: patient  Type:  Sooner Appointment Request    Caller is requesting a sooner appointment.  Caller declined first available appointment listed below.  Caller will not accept being placed on the waitlist and is requesting a message be sent to doctor.    Name of Caller:  patient  When is the first available appointment?    Symptoms:  thyroid nodule  Would the patient rather a call back or a response via [facility]? call  Best Call Back Number:  [phone]  Additional Information:  Please call patient to advise.  Thanks!

## 2024-09-18 NOTE — TELEPHONE ENCOUNTER
Appt sched for next avail with Dr. Landis, 12/6/24, for thyroid nodule. Pt will bring a copy of US report.

## 2025-07-18 ENCOUNTER — OFFICE VISIT (OUTPATIENT)
Dept: FAMILY MEDICINE | Facility: CLINIC | Age: 55
End: 2025-07-18
Payer: COMMERCIAL

## 2025-07-18 VITALS
SYSTOLIC BLOOD PRESSURE: 132 MMHG | OXYGEN SATURATION: 98 % | BODY MASS INDEX: 27.72 KG/M2 | DIASTOLIC BLOOD PRESSURE: 80 MMHG | HEART RATE: 74 BPM | WEIGHT: 156.44 LBS | HEIGHT: 63 IN

## 2025-07-18 DIAGNOSIS — R59.0 AXILLARY LYMPHADENOPATHY: Primary | ICD-10-CM

## 2025-07-18 DIAGNOSIS — N64.59 ABNORMAL BREAST EXAM: ICD-10-CM

## 2025-07-18 DIAGNOSIS — N64.4 BREAST PAIN, RIGHT: ICD-10-CM

## 2025-07-18 PROCEDURE — 99999 PR PBB SHADOW E&M-EST. PATIENT-LVL III: CPT | Mod: PBBFAC,,, | Performed by: STUDENT IN AN ORGANIZED HEALTH CARE EDUCATION/TRAINING PROGRAM

## 2025-07-18 NOTE — PROGRESS NOTES
"Plan:      Antonietta Cortez" was seen today for breast pain.    Diagnoses and all orders for this visit:    Axillary lymphadenopathy  -     Mammo Digital Diagnostic Right with Emmanuel (XPD); Future  -     US Breast Bilateral Limited; Future    Breast pain, right  -     Mammo Digital Diagnostic Right with Emmanuel (XPD); Future  -     US Breast Bilateral Limited; Future    Abnormal breast exam  -     Mammo Digital Diagnostic Right with Emmanuel (XPD); Future  -     US Breast Bilateral Limited; Future      Follow up if symptoms worsen or fail to improve.    Leyda Hartmann MD  07/18/2025    Subjective:      Patient ID: Antonietta Wright is a 55 y.o. female    Chief Complaint   Patient presents with    Breast Pain     HPI  55 y.o. female with a PMHx as documented below presents to clinic today for the following:    History of Present Illness    CHIEF COMPLAINT:  Patient presents today with breast pain.    HISTORY OF PRESENT ILLNESS:  She reports sharp breast pain for 4-5 weeks, describing the sensation as feeling like "a shard" or "hanging from a meat clip". Pain is most prominent in the morning upon waking and improves with direct pressure. The pain does not persist throughout the entire day. She notes asymmetrical nipple changes, with one nipple appearing retracted compared to the other. She denies current nipple discharge. She reports swollen lymph nodes bilaterally in both axillas, described as approximately equal in swelling. Despite having a high pain tolerance, she describes the current breast pain as particularly intense.    MEDICAL HISTORY:  She experiences episodes of lethargy and near-syncope, describing feeling "loopy" and needing to lay down to prevent passing out. She reports feeling unwell but denies fevers.    SURGICAL HISTORY:  She has a history of benign breast tumor removal with subsequent reconstruction at [facility]. Two spots were identified and confirmed as benign, likely related to her fibrocystic breast " "condition.     ROS  Constitutional:  Negative for chills and fever.   Respiratory:  Negative for shortness of breath.    Cardiovascular:  Negative for chest pain.   Gastrointestinal:  Negative for abdominal pain, constipation, diarrhea, nausea and vomiting.     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    Fibrocystic breast     Gallstones     Generalized headaches 04/02/2014    Glaucomatous optic atrophy, bilateral     Hemangioma of liver 07/2015    x2, left hepatic lobe and posterior segment of the right hepatic lobe    History of pneumonia     History of syncope     Hypermetropia, bilateral     Keratoconjunctivitis sicca not specified as Sjogren's, bilateral     Lung nodules 03/25/2020    x2: 3 mm RML, 5 mm RLL    Mixed hyperlipidemia 03/06/2023    Osteopenia     Papilloma of left breast 10/18/2016    PONV (postoperative nausea and vomiting)     Regular astigmatism, bilateral     Thyroid disease     Tinnitus aurium, bilateral 04/09/2019    S/p audiology and ENT eval    Uterine leiomyoma     Vitreous degeneration, bilateral       Objective:      Vitals:    07/18/25 1551   BP: 132/80   BP Location: Left arm   Patient Position: Sitting   Pulse: 74   SpO2: 98%   Weight: 70.9 kg (156 lb 6.7 oz)   Height: 5' 3" (1.6 m)     Body mass index is 27.71 kg/m².    Physical Exam  Vitals reviewed.   Constitutional:       General: She is not in acute distress.  HENT:      Head: Normocephalic and atraumatic.   Cardiovascular:      Rate and Rhythm: Normal rate.   Pulmonary:      Effort: Pulmonary effort is normal. No respiratory " distress.   Chest:      Comments:   No discrete masses, skin discoloration/texture changes, nipple discharge. Both nipples everted.     Significant tenderness, diffuse, with moderate pressure/palpation of the right breast.     Significant bilateral lymphadenopathy, right slightly greater than the left.   Neurological:      General: No focal deficit present.      Mental Status: She is alert and oriented to person, place, and time. Mental status is at baseline.       Assessment:       1. Axillary lymphadenopathy    2. Breast pain, right    3. Abnormal breast exam        Leyda Hartmann MD  [facility]  Office: [phone]   Fax: [phone]  07/18/2025      Disclaimer: This note was partly generated using dictation software which may occasionally result in transcription errors.    Total time spent on this encounter includes face to face time and non-face to face time preparing to see the patient (eg, review of tests), obtaining and/or reviewing separately obtained history, documenting clinical information in the electronic or other health record, independently interpreting results, and communicating results to the patient/family/caregiver, or care coordinator.      Visit today included increased complexity associated with the care of the episodic problem (see above) addressed and managing the longitudinal care of the patient due to the serious and/or complex managed problem(s) (see above).